# Patient Record
Sex: FEMALE | Race: WHITE | NOT HISPANIC OR LATINO | Employment: OTHER | URBAN - METROPOLITAN AREA
[De-identification: names, ages, dates, MRNs, and addresses within clinical notes are randomized per-mention and may not be internally consistent; named-entity substitution may affect disease eponyms.]

---

## 2023-03-05 LAB — URINE CULTURE: NORMAL

## 2023-03-06 LAB
ANION GAP IN SER/PLAS: 13 MMOL/L (ref 10–20)
CALCIUM (MG/DL) IN SER/PLAS: 10.1 MG/DL (ref 8.6–10.3)
CARBON DIOXIDE, TOTAL (MMOL/L) IN SER/PLAS: 25 MMOL/L (ref 21–32)
CHLORIDE (MMOL/L) IN SER/PLAS: 104 MMOL/L (ref 98–107)
CREATININE (MG/DL) IN SER/PLAS: 1.09 MG/DL (ref 0.5–1.05)
ERYTHROCYTE DISTRIBUTION WIDTH (RATIO) BY AUTOMATED COUNT: 16.3 % (ref 11.5–14.5)
ERYTHROCYTE MEAN CORPUSCULAR HEMOGLOBIN CONCENTRATION (G/DL) BY AUTOMATED: 31.4 G/DL (ref 32–36)
ERYTHROCYTE MEAN CORPUSCULAR VOLUME (FL) BY AUTOMATED COUNT: 92 FL (ref 80–100)
ERYTHROCYTES (10*6/UL) IN BLOOD BY AUTOMATED COUNT: 4.21 X10E12/L (ref 4–5.2)
GFR FEMALE: 53 ML/MIN/1.73M2
GLUCOSE (MG/DL) IN SER/PLAS: 147 MG/DL (ref 74–99)
HEMATOCRIT (%) IN BLOOD BY AUTOMATED COUNT: 38.8 % (ref 36–46)
HEMOGLOBIN (G/DL) IN BLOOD: 12.2 G/DL (ref 12–16)
LEUKOCYTES (10*3/UL) IN BLOOD BY AUTOMATED COUNT: 9 X10E9/L (ref 4.4–11.3)
NRBC (PER 100 WBCS) BY AUTOMATED COUNT: 0 /100 WBC (ref 0–0)
PLATELETS (10*3/UL) IN BLOOD AUTOMATED COUNT: 205 X10E9/L (ref 150–450)
POTASSIUM (MMOL/L) IN SER/PLAS: 4.3 MMOL/L (ref 3.5–5.3)
SODIUM (MMOL/L) IN SER/PLAS: 138 MMOL/L (ref 136–145)
UREA NITROGEN (MG/DL) IN SER/PLAS: 27 MG/DL (ref 6–23)

## 2023-03-08 LAB — URINE CULTURE: NORMAL

## 2023-09-29 ENCOUNTER — APPOINTMENT (OUTPATIENT)
Dept: PRIMARY CARE | Facility: CLINIC | Age: 78
End: 2023-09-29
Payer: MEDICARE

## 2023-10-04 PROBLEM — R06.02 SOB (SHORTNESS OF BREATH): Status: ACTIVE | Noted: 2023-10-04

## 2023-10-04 PROBLEM — N81.4 CYSTOCELE WITH PROLAPSE: Status: ACTIVE | Noted: 2023-10-04

## 2023-10-04 PROBLEM — G54.1 LUMBOSACRAL PLEXOPATHY: Status: ACTIVE | Noted: 2023-10-04

## 2023-10-04 PROBLEM — M41.9 SCOLIOSIS: Status: ACTIVE | Noted: 2023-10-04

## 2023-10-04 PROBLEM — L21.9 SEBORRHEIC DERMATITIS OF SCALP: Status: ACTIVE | Noted: 2023-10-04

## 2023-10-04 PROBLEM — B37.2 MONILIASIS, CUTANEOUS: Status: ACTIVE | Noted: 2023-10-04

## 2023-10-04 PROBLEM — R09.02 HYPOXIA: Status: ACTIVE | Noted: 2023-10-04

## 2023-10-04 PROBLEM — J12.82 PNEUMONIA DUE TO COVID-19 VIRUS: Status: ACTIVE | Noted: 2023-10-04

## 2023-10-04 PROBLEM — M34.9 SCLERODERMA (MULTI): Status: ACTIVE | Noted: 2023-10-04

## 2023-10-04 PROBLEM — K21.9 GERD (GASTROESOPHAGEAL REFLUX DISEASE): Status: ACTIVE | Noted: 2023-10-04

## 2023-10-04 PROBLEM — M34.1 CREST (CALCINOSIS, RAYNAUD'S PHENOMENON, ESOPHAGEAL DYSFUNCTION, SCLERODACTYLY, TELANGIECTASIA) (MULTI): Status: ACTIVE | Noted: 2023-10-04

## 2023-10-04 PROBLEM — I10 BENIGN HYPERTENSION: Status: ACTIVE | Noted: 2023-10-04

## 2023-10-04 PROBLEM — M48.062 LUMBAR STENOSIS WITH NEUROGENIC CLAUDICATION: Status: ACTIVE | Noted: 2023-10-04

## 2023-10-04 PROBLEM — E78.5 HYPERLIPIDEMIA: Status: ACTIVE | Noted: 2023-10-04

## 2023-10-04 PROBLEM — U07.1 PNEUMONIA DUE TO COVID-19 VIRUS: Status: ACTIVE | Noted: 2023-10-04

## 2023-10-04 RX ORDER — FOLIC ACID/MULTIVIT,IRON,MINER 0.4MG-18MG
2 TABLET ORAL DAILY
COMMUNITY

## 2023-10-04 RX ORDER — KETOROLAC TROMETHAMINE 10 MG/1
TABLET, FILM COATED ORAL
COMMUNITY
Start: 2023-03-14 | End: 2024-01-09 | Stop reason: WASHOUT

## 2023-10-04 RX ORDER — CHOLECALCIFEROL (VITAMIN D3) 125 MCG
CAPSULE ORAL
COMMUNITY

## 2023-10-04 RX ORDER — AMLODIPINE BESYLATE 10 MG/1
TABLET ORAL
COMMUNITY
Start: 2023-02-14 | End: 2023-10-05 | Stop reason: ALTCHOICE

## 2023-10-04 RX ORDER — LOSARTAN POTASSIUM 100 MG/1
TABLET ORAL
COMMUNITY
Start: 2023-02-14 | End: 2023-10-05 | Stop reason: ALTCHOICE

## 2023-10-04 RX ORDER — KETOCONAZOLE 20 MG/ML
SHAMPOO, SUSPENSION TOPICAL EVERY OTHER DAY
COMMUNITY
Start: 2019-07-02 | End: 2023-10-05 | Stop reason: SDUPTHER

## 2023-10-04 RX ORDER — PANTOPRAZOLE SODIUM 20 MG/1
1 TABLET, DELAYED RELEASE ORAL DAILY
COMMUNITY
Start: 2016-08-03 | End: 2024-01-15

## 2023-10-04 RX ORDER — CLOBETASOL PROPIONATE 0.5 MG/ML
LOTION TOPICAL 2 TIMES DAILY
COMMUNITY
Start: 2019-07-02

## 2023-10-04 RX ORDER — CHOLECALCIFEROL (VITAMIN D3) 25 MCG
1 TABLET ORAL DAILY
COMMUNITY
End: 2024-01-09 | Stop reason: WASHOUT

## 2023-10-04 RX ORDER — AMLODIPINE BESYLATE 5 MG/1
1 TABLET ORAL DAILY
COMMUNITY
End: 2023-10-06 | Stop reason: SDUPTHER

## 2023-10-04 RX ORDER — CLOTRIMAZOLE AND BETAMETHASONE DIPROPIONATE 10; .64 MG/G; MG/G
CREAM TOPICAL 2 TIMES DAILY
COMMUNITY
Start: 2019-07-02 | End: 2024-01-09 | Stop reason: WASHOUT

## 2023-10-04 RX ORDER — OXYCODONE AND ACETAMINOPHEN 5; 325 MG/1; MG/1
TABLET ORAL
COMMUNITY
Start: 2023-03-31 | End: 2024-01-09 | Stop reason: WASHOUT

## 2023-10-05 ENCOUNTER — TELEPHONE (OUTPATIENT)
Dept: PRIMARY CARE | Facility: CLINIC | Age: 78
End: 2023-10-05

## 2023-10-05 ENCOUNTER — OFFICE VISIT (OUTPATIENT)
Dept: PRIMARY CARE | Facility: CLINIC | Age: 78
End: 2023-10-05
Payer: MEDICARE

## 2023-10-05 VITALS
OXYGEN SATURATION: 95 % | HEIGHT: 63 IN | WEIGHT: 179.8 LBS | BODY MASS INDEX: 31.86 KG/M2 | SYSTOLIC BLOOD PRESSURE: 126 MMHG | DIASTOLIC BLOOD PRESSURE: 82 MMHG | TEMPERATURE: 97.8 F | HEART RATE: 81 BPM

## 2023-10-05 DIAGNOSIS — I10 BENIGN HYPERTENSION: ICD-10-CM

## 2023-10-05 DIAGNOSIS — M17.11 PRIMARY OSTEOARTHRITIS OF RIGHT KNEE: ICD-10-CM

## 2023-10-05 DIAGNOSIS — M41.87 OTHER FORM OF SCOLIOSIS OF LUMBOSACRAL SPINE: ICD-10-CM

## 2023-10-05 DIAGNOSIS — J84.10 PULMONARY FIBROSIS (MULTI): Primary | ICD-10-CM

## 2023-10-05 DIAGNOSIS — R73.9 HYPERGLYCEMIA: ICD-10-CM

## 2023-10-05 DIAGNOSIS — R53.83 OTHER FATIGUE: ICD-10-CM

## 2023-10-05 DIAGNOSIS — E78.5 HYPERLIPIDEMIA, UNSPECIFIED HYPERLIPIDEMIA TYPE: ICD-10-CM

## 2023-10-05 DIAGNOSIS — L21.9 SEBORRHEIC DERMATITIS OF SCALP: Primary | ICD-10-CM

## 2023-10-05 DIAGNOSIS — M34.1 CREST (CALCINOSIS, RAYNAUD'S PHENOMENON, ESOPHAGEAL DYSFUNCTION, SCLERODACTYLY, TELANGIECTASIA) (MULTI): ICD-10-CM

## 2023-10-05 PROBLEM — N81.4 CYSTOCELE WITH PROLAPSE: Status: RESOLVED | Noted: 2023-10-04 | Resolved: 2023-10-05

## 2023-10-05 LAB
ALBUMIN SERPL BCP-MCNC: 4.3 G/DL (ref 3.4–5)
ALP SERPL-CCNC: 100 U/L (ref 33–136)
ALT SERPL W P-5'-P-CCNC: 17 U/L (ref 7–45)
ANION GAP SERPL CALC-SCNC: 15 MMOL/L (ref 10–20)
AST SERPL W P-5'-P-CCNC: 15 U/L (ref 9–39)
BACTERIA #/AREA URNS AUTO: ABNORMAL /HPF
BASOPHILS # BLD AUTO: 0.08 X10*3/UL (ref 0–0.1)
BASOPHILS NFR BLD AUTO: 1 %
BILIRUB SERPL-MCNC: 0.2 MG/DL (ref 0–1.2)
BUN SERPL-MCNC: 28 MG/DL (ref 6–23)
CALCIUM SERPL-MCNC: 10.3 MG/DL (ref 8.6–10.6)
CHLORIDE SERPL-SCNC: 101 MMOL/L (ref 98–107)
CO2 SERPL-SCNC: 26 MMOL/L (ref 21–32)
CREAT SERPL-MCNC: 0.95 MG/DL (ref 0.5–1.05)
EOSINOPHIL # BLD AUTO: 0.23 X10*3/UL (ref 0–0.4)
EOSINOPHIL NFR BLD AUTO: 2.7 %
ERYTHROCYTE [DISTWIDTH] IN BLOOD BY AUTOMATED COUNT: 17.5 % (ref 11.5–14.5)
EST. AVERAGE GLUCOSE BLD GHB EST-MCNC: 128 MG/DL
GFR SERPL CREATININE-BSD FRML MDRD: 62 ML/MIN/1.73M*2
GLUCOSE SERPL-MCNC: 92 MG/DL (ref 74–99)
HBA1C MFR BLD: 6.1 %
HCT VFR BLD AUTO: 40.5 % (ref 36–46)
HGB BLD-MCNC: 12.5 G/DL (ref 12–16)
IMM GRANULOCYTES # BLD AUTO: 0.03 X10*3/UL (ref 0–0.5)
IMM GRANULOCYTES NFR BLD AUTO: 0.4 % (ref 0–0.9)
LYMPHOCYTES # BLD AUTO: 3.35 X10*3/UL (ref 0.8–3)
LYMPHOCYTES NFR BLD AUTO: 40 %
MCH RBC QN AUTO: 28.9 PG (ref 26–34)
MCHC RBC AUTO-ENTMCNC: 30.9 G/DL (ref 32–36)
MCV RBC AUTO: 94 FL (ref 80–100)
MONOCYTES # BLD AUTO: 0.71 X10*3/UL (ref 0.05–0.8)
MONOCYTES NFR BLD AUTO: 8.5 %
MUCOUS THREADS #/AREA URNS AUTO: ABNORMAL /LPF
NEUTROPHILS # BLD AUTO: 3.97 X10*3/UL (ref 1.6–5.5)
NEUTROPHILS NFR BLD AUTO: 47.4 %
NRBC BLD-RTO: 0 /100 WBCS (ref 0–0)
PLATELET # BLD AUTO: 165 X10*3/UL (ref 150–450)
PMV BLD AUTO: 13 FL (ref 7.5–11.5)
POTASSIUM SERPL-SCNC: 4.6 MMOL/L (ref 3.5–5.3)
PROT SERPL-MCNC: 7.7 G/DL (ref 6.4–8.2)
RBC # BLD AUTO: 4.32 X10*6/UL (ref 4–5.2)
RBC #/AREA URNS AUTO: ABNORMAL /HPF
SODIUM SERPL-SCNC: 137 MMOL/L (ref 136–145)
SQUAMOUS #/AREA URNS AUTO: ABNORMAL /HPF
VIT B12 SERPL-MCNC: 1176 PG/ML (ref 211–911)
WBC # BLD AUTO: 8.4 X10*3/UL (ref 4.4–11.3)
WBC #/AREA URNS AUTO: ABNORMAL /HPF

## 2023-10-05 PROCEDURE — 1159F MED LIST DOCD IN RCRD: CPT | Performed by: FAMILY MEDICINE

## 2023-10-05 PROCEDURE — 1036F TOBACCO NON-USER: CPT | Performed by: FAMILY MEDICINE

## 2023-10-05 PROCEDURE — 1160F RVW MEDS BY RX/DR IN RCRD: CPT | Performed by: FAMILY MEDICINE

## 2023-10-05 PROCEDURE — 82607 VITAMIN B-12: CPT

## 2023-10-05 PROCEDURE — 85025 COMPLETE CBC W/AUTO DIFF WBC: CPT

## 2023-10-05 PROCEDURE — 36415 COLL VENOUS BLD VENIPUNCTURE: CPT

## 2023-10-05 PROCEDURE — 99214 OFFICE O/P EST MOD 30 MIN: CPT | Performed by: FAMILY MEDICINE

## 2023-10-05 PROCEDURE — 83036 HEMOGLOBIN GLYCOSYLATED A1C: CPT

## 2023-10-05 PROCEDURE — 80053 COMPREHEN METABOLIC PANEL: CPT

## 2023-10-05 PROCEDURE — 81001 URINALYSIS AUTO W/SCOPE: CPT

## 2023-10-05 PROCEDURE — 3074F SYST BP LT 130 MM HG: CPT | Performed by: FAMILY MEDICINE

## 2023-10-05 PROCEDURE — 1125F AMNT PAIN NOTED PAIN PRSNT: CPT | Performed by: FAMILY MEDICINE

## 2023-10-05 PROCEDURE — 3079F DIAST BP 80-89 MM HG: CPT | Performed by: FAMILY MEDICINE

## 2023-10-05 RX ORDER — KETOCONAZOLE 20 MG/ML
SHAMPOO, SUSPENSION TOPICAL EVERY OTHER DAY
Qty: 120 ML | Refills: 2 | Status: SHIPPED | OUTPATIENT
Start: 2023-10-05

## 2023-10-05 RX ORDER — POLYETHYLENE GLYCOL 3350 17 G/17G
POWDER, FOR SOLUTION ORAL
COMMUNITY
Start: 2023-03-14 | End: 2024-01-09 | Stop reason: WASHOUT

## 2023-10-05 RX ORDER — PHENAZOPYRIDINE HYDROCHLORIDE 100 MG/1
200 TABLET, FILM COATED ORAL 3 TIMES DAILY
COMMUNITY
Start: 2023-03-14 | End: 2024-01-09 | Stop reason: WASHOUT

## 2023-10-05 RX ORDER — ERGOCALCIFEROL 1.25 MG/1
CAPSULE ORAL
COMMUNITY
Start: 2008-09-24 | End: 2024-01-09 | Stop reason: WASHOUT

## 2023-10-05 RX ORDER — GLUCOSAMINE/CHONDROITIN/C/MANG 500-400 MG
CAPSULE ORAL
COMMUNITY
Start: 2008-06-18 | End: 2024-01-09 | Stop reason: WASHOUT

## 2023-10-05 SDOH — ECONOMIC STABILITY: TRANSPORTATION INSECURITY
IN THE PAST 12 MONTHS, HAS THE LACK OF TRANSPORTATION KEPT YOU FROM MEDICAL APPOINTMENTS OR FROM GETTING MEDICATIONS?: NO

## 2023-10-05 SDOH — ECONOMIC STABILITY: TRANSPORTATION INSECURITY
IN THE PAST 12 MONTHS, HAS LACK OF TRANSPORTATION KEPT YOU FROM MEETINGS, WORK, OR FROM GETTING THINGS NEEDED FOR DAILY LIVING?: NO

## 2023-10-05 SDOH — ECONOMIC STABILITY: INCOME INSECURITY: IN THE LAST 12 MONTHS, WAS THERE A TIME WHEN YOU WERE NOT ABLE TO PAY THE MORTGAGE OR RENT ON TIME?: NO

## 2023-10-05 SDOH — ECONOMIC STABILITY: HOUSING INSECURITY
IN THE LAST 12 MONTHS, WAS THERE A TIME WHEN YOU DID NOT HAVE A STEADY PLACE TO SLEEP OR SLEPT IN A SHELTER (INCLUDING NOW)?: NO

## 2023-10-05 SDOH — ECONOMIC STABILITY: FOOD INSECURITY: WITHIN THE PAST 12 MONTHS, THE FOOD YOU BOUGHT JUST DIDN'T LAST AND YOU DIDN'T HAVE MONEY TO GET MORE.: NEVER TRUE

## 2023-10-05 SDOH — ECONOMIC STABILITY: FOOD INSECURITY: WITHIN THE PAST 12 MONTHS, YOU WORRIED THAT YOUR FOOD WOULD RUN OUT BEFORE YOU GOT MONEY TO BUY MORE.: NEVER TRUE

## 2023-10-05 ASSESSMENT — SOCIAL DETERMINANTS OF HEALTH (SDOH)
WITHIN THE LAST YEAR, HAVE YOU BEEN KICKED, HIT, SLAPPED, OR OTHERWISE PHYSICALLY HURT BY YOUR PARTNER OR EX-PARTNER?: NO
WITHIN THE LAST YEAR, HAVE TO BEEN RAPED OR FORCED TO HAVE ANY KIND OF SEXUAL ACTIVITY BY YOUR PARTNER OR EX-PARTNER?: NO
IN THE PAST 12 MONTHS, HAS THE ELECTRIC, GAS, OIL, OR WATER COMPANY THREATENED TO SHUT OFF SERVICE IN YOUR HOME?: NO
HOW HARD IS IT FOR YOU TO PAY FOR THE VERY BASICS LIKE FOOD, HOUSING, MEDICAL CARE, AND HEATING?: NOT HARD AT ALL
WITHIN THE LAST YEAR, HAVE YOU BEEN HUMILIATED OR EMOTIONALLY ABUSED IN OTHER WAYS BY YOUR PARTNER OR EX-PARTNER?: NO
WITHIN THE LAST YEAR, HAVE YOU BEEN AFRAID OF YOUR PARTNER OR EX-PARTNER?: NO

## 2023-10-05 ASSESSMENT — PATIENT HEALTH QUESTIONNAIRE - PHQ9
1. LITTLE INTEREST OR PLEASURE IN DOING THINGS: NOT AT ALL
SUM OF ALL RESPONSES TO PHQ9 QUESTIONS 1 & 2: 0
2. FEELING DOWN, DEPRESSED OR HOPELESS: NOT AT ALL

## 2023-10-05 ASSESSMENT — LIFESTYLE VARIABLES
HOW OFTEN DO YOU HAVE SIX OR MORE DRINKS ON ONE OCCASION: NEVER
AUDIT-C TOTAL SCORE: 0
HOW OFTEN DO YOU HAVE A DRINK CONTAINING ALCOHOL: NEVER
HOW MANY STANDARD DRINKS CONTAINING ALCOHOL DO YOU HAVE ON A TYPICAL DAY: PATIENT DOES NOT DRINK
SKIP TO QUESTIONS 9-10: 1

## 2023-10-05 ASSESSMENT — ENCOUNTER SYMPTOMS
DEPRESSION: 0
CONSTITUTIONAL NEGATIVE: 1
FREQUENCY: 1
SHORTNESS OF BREATH: 1
ARTHRALGIAS: 1
LOSS OF SENSATION IN FEET: 0
CARDIOVASCULAR NEGATIVE: 1
OCCASIONAL FEELINGS OF UNSTEADINESS: 0
BACK PAIN: 1
PSYCHIATRIC NEGATIVE: 1

## 2023-10-05 ASSESSMENT — PAIN SCALES - GENERAL: PAINLEVEL: 8

## 2023-10-05 NOTE — PROGRESS NOTES
"Subjective   Patient ID: Silvia Damico is a 77 y.o. female who presents for consultation (Discuss bp and knee pain).    HPI     Review of Systems   Constitutional: Negative.    Respiratory:  Positive for shortness of breath.    Cardiovascular: Negative.    Genitourinary:  Positive for frequency.   Musculoskeletal:  Positive for arthralgias and back pain.   Psychiatric/Behavioral: Negative.         Objective   /82 (BP Location: Left arm)   Pulse 81   Temp 36.6 °C (97.8 °F) (Temporal)   Ht 1.6 m (5' 3\")   Wt 81.6 kg (179 lb 12.8 oz)   SpO2 95%   BMI 31.85 kg/m²     Physical Exam  Vitals and nursing note reviewed.   Cardiovascular:      Rate and Rhythm: Normal rate and regular rhythm.      Heart sounds: Normal heart sounds.   Pulmonary:      Breath sounds: Normal breath sounds. Decreased air movement present.   Musculoskeletal:      Comments: scoliosis   Neurological:      Mental Status: She is alert and oriented to person, place, and time.   Psychiatric:         Mood and Affect: Mood normal.         Behavior: Behavior normal.         Assessment/Plan patient seen here for some increased shortness of breath especially with exercise.  Her chest x-ray has been slightly abnormal since she had COVID we are getting a CT scan of her chest.  And checking some lab work here today.  She also is getting an x-ray of her right knee.  I will let her know the results as soon as available.  Problem List Items Addressed This Visit             ICD-10-CM    Benign hypertension I10    Relevant Orders    CBC and Auto Differential    Comprehensive Metabolic Panel    Hemoglobin A1C    Urinalysis Microscopic Only    CREST (calcinosis, Raynaud's phenomenon, esophageal dysfunction, sclerodactyly, telangiectasia) (CMS/[facility]) M34.1    Hyperlipidemia E78.5    Scoliosis M41.9     Other Visit Diagnoses         Codes    Pulmonary fibrosis (CMS/[facility])    -  Primary J84.10    Relevant Orders    CT chest wo IV contrast    Primary osteoarthritis of " right knee     M17.11    Relevant Orders    XR knee right 3 views    Other fatigue     R53.83    Relevant Orders    Vitamin B12    Hyperglycemia     R73.9    Relevant Orders    Hemoglobin A1C

## 2023-10-05 NOTE — TELEPHONE ENCOUNTER
Pt was just in & needed refill on amlodipine 5 mg once daily to [facility]  [phone]  Allergy to cephalexin  Ty

## 2023-10-06 DIAGNOSIS — I10 BENIGN HYPERTENSION: Primary | ICD-10-CM

## 2023-10-06 RX ORDER — AMLODIPINE BESYLATE 5 MG/1
5 TABLET ORAL DAILY
Qty: 90 TABLET | Refills: 3 | Status: SHIPPED | OUTPATIENT
Start: 2023-10-06

## 2023-10-09 ENCOUNTER — HOSPITAL ENCOUNTER (OUTPATIENT)
Dept: RADIOLOGY | Facility: HOSPITAL | Age: 78
Discharge: HOME | End: 2023-10-09
Payer: MEDICARE

## 2023-10-09 ENCOUNTER — ANCILLARY PROCEDURE (OUTPATIENT)
Dept: RADIOLOGY | Facility: CLINIC | Age: 78
End: 2023-10-09
Payer: MEDICARE

## 2023-10-09 DIAGNOSIS — M17.11 PRIMARY OSTEOARTHRITIS OF RIGHT KNEE: ICD-10-CM

## 2023-10-09 DIAGNOSIS — J84.10 PULMONARY FIBROSIS (MULTI): ICD-10-CM

## 2023-10-09 PROCEDURE — 71250 CT THORAX DX C-: CPT | Performed by: RADIOLOGY

## 2023-10-09 PROCEDURE — 73562 X-RAY EXAM OF KNEE 3: CPT | Mod: RT,FY

## 2023-10-09 PROCEDURE — 73562 X-RAY EXAM OF KNEE 3: CPT | Mod: RIGHT SIDE | Performed by: RADIOLOGY

## 2023-10-09 PROCEDURE — 71250 CT THORAX DX C-: CPT | Mod: ME

## 2023-10-30 PROBLEM — D69.6 THROMBOCYTOPENIA (CMS-HCC): Status: ACTIVE | Noted: 2022-01-06

## 2023-10-30 RX ORDER — LOSARTAN POTASSIUM 100 MG/1
100 TABLET ORAL DAILY
COMMUNITY
End: 2024-01-09 | Stop reason: WASHOUT

## 2023-10-31 ENCOUNTER — OFFICE VISIT (OUTPATIENT)
Dept: ORTHOPEDIC SURGERY | Facility: CLINIC | Age: 78
End: 2023-10-31
Payer: MEDICARE

## 2023-10-31 ENCOUNTER — ANCILLARY PROCEDURE (OUTPATIENT)
Dept: RADIOLOGY | Facility: CLINIC | Age: 78
End: 2023-10-31
Payer: MEDICARE

## 2023-10-31 VITALS — BODY MASS INDEX: 31.71 KG/M2 | WEIGHT: 179 LBS | HEIGHT: 63 IN

## 2023-10-31 DIAGNOSIS — M17.10 ARTHRITIS OF KNEE: Primary | ICD-10-CM

## 2023-10-31 DIAGNOSIS — M25.561 ACUTE PAIN OF RIGHT KNEE: ICD-10-CM

## 2023-10-31 DIAGNOSIS — M75.81 ROTATOR CUFF TENDINITIS, RIGHT: ICD-10-CM

## 2023-10-31 PROCEDURE — 20610 DRAIN/INJ JOINT/BURSA W/O US: CPT | Performed by: ORTHOPAEDIC SURGERY

## 2023-10-31 PROCEDURE — 3074F SYST BP LT 130 MM HG: CPT | Performed by: ORTHOPAEDIC SURGERY

## 2023-10-31 PROCEDURE — 99204 OFFICE O/P NEW MOD 45 MIN: CPT | Performed by: ORTHOPAEDIC SURGERY

## 2023-10-31 PROCEDURE — 1159F MED LIST DOCD IN RCRD: CPT | Performed by: ORTHOPAEDIC SURGERY

## 2023-10-31 PROCEDURE — 73030 X-RAY EXAM OF SHOULDER: CPT | Mod: RIGHT SIDE | Performed by: STUDENT IN AN ORGANIZED HEALTH CARE EDUCATION/TRAINING PROGRAM

## 2023-10-31 PROCEDURE — 1036F TOBACCO NON-USER: CPT | Performed by: ORTHOPAEDIC SURGERY

## 2023-10-31 PROCEDURE — 1125F AMNT PAIN NOTED PAIN PRSNT: CPT | Performed by: ORTHOPAEDIC SURGERY

## 2023-10-31 PROCEDURE — 73030 X-RAY EXAM OF SHOULDER: CPT | Mod: RT,FY

## 2023-10-31 PROCEDURE — 1160F RVW MEDS BY RX/DR IN RCRD: CPT | Performed by: ORTHOPAEDIC SURGERY

## 2023-10-31 PROCEDURE — 3079F DIAST BP 80-89 MM HG: CPT | Performed by: ORTHOPAEDIC SURGERY

## 2023-10-31 RX ORDER — TRIAMCINOLONE ACETONIDE 40 MG/ML
40 INJECTION, SUSPENSION INTRA-ARTICULAR; INTRAMUSCULAR
Status: COMPLETED | OUTPATIENT
Start: 2023-10-31 | End: 2023-10-31

## 2023-10-31 RX ADMIN — TRIAMCINOLONE ACETONIDE 40 MG: 40 INJECTION, SUSPENSION INTRA-ARTICULAR; INTRAMUSCULAR at 17:26

## 2023-10-31 NOTE — PROGRESS NOTES
77-year-old is seen with right knee pain and right shoulder pain.  She been having persistent severe sharp shooting pain in the right knee is worse with standing and walking.  Pain is worse going up and down stairs and getting up and down from a chair in and out of a car.  She had shingles involving her left leg and still has some left calf numbness.  She is also been having pain in the right shoulder and difficulty with overhead reaching and lifting activities.  She has applied ice and used aspirin.  She uses a cane or a walker when needed.  She has had chiropractic treatments.    Pleasant and no acute distress. Walks with antalgic gait. Stands with varus alignment of the right knee and neutral on the left.  Right knee range of motion is 5-110°. The knee is stable to varus and valgus stress Lachman and posterior drawer. There is a mild effusion. There is generalized tenderness. Left knee range of motion is 0-120°. There is no effusion. The knee is stable to varus and valgus stress Lachman and posterior drawer. Both lower extremities are well perfused the skin is intact and muscle tone is adequate.  The right shoulder forward flexion 160 degrees and there is positive Neer and Krishnamurthy impingement.    Multiple x-ray views of the right knee are personally reviewed and there is advanced degenerative changes involving the right knee with joint space narrowing and osteophyte formation    A discussion about knee arthritis was done.  Treatment options were reviewed and the decision was made proceed with cortisone injection.  She will ice and use aspirin and avoid aggravating activities.  She would benefit from physical therapy for the knee and the shoulder.  She is also been sent for x-rays of the shoulder and these are personally reviewed and these demonstrated no acute bony abnormality.  There is sclerosis along the greater tuberosity..  At some point she may be a candidate for knee replacement and this was reviewed with  her.  In regard to the shoulder if symptoms persist then a injection for the right shoulder could also be considered.    L Inj/Asp: R knee on 10/31/2023 5:26 PM  Indications: pain  Details: 22 G needle, anterolateral approach  Medications: 40 mg triamcinolone acetonide 40 mg/mL  Procedure, treatment alternatives, risks and benefits explained, specific risks discussed. Consent was given by the patient.

## 2023-11-09 NOTE — PROGRESS NOTES
Physical Therapy Evaluation    Patient Name: Silvia Damico  MRN:  [id number]  Today's Date: [unfilled]               INSURANCE  MEDICAREMEDICARE PART A AND B   Visit #1 of 16  Referred by: Teofilo Alejandre M.D.  Referred for   1. Arthritis of knee  Referral to Physical Therapy    Follow Up In Physical Therapy      2. Rotator cuff tendinitis, right  Referral to Physical Therapy    Follow Up In Physical Therapy        Cert Dates Start 11/10/23 Cert Date End 02/05/23    ASSESSMENT    Patient came in 15 min late to the appointment today.  Patient demonstrated decreased range of motion in her right shoulder and right knee, increased pain, decreased activity level and decreased overall function at home and in the community.  Patient has complex medical issues.  Patient will benefit from one on one skilled therapy to address these impairments and return to prior level of functioning.     PLAN  Goals  1. Pt will be independent in HEP in 6-8 weeks  2. Pt will report 0-4/10 R. Knee and right shoulder at rest and with activity in 6-8 weeks.  3. Pt will demonstrate R. shoulder range of motion within functional limits to improve overhead activities, R. Knee range of motion WFL to be able to go for 1-2 blocks walking without pain in 6-8 weeks.  4. Pt will demonstrate 4+ to 5/5 R. Knee  and R. shoulder strength to return to 4-4+/5 in available range for 6-8 weeks to be able to perform daily activities with ease  5. Pt will report 75 % of functional score with LEFS and QUIICK DASH in 6-8 weeks.    Plan of care to include: therapeutic exercise, therapeutic activity, soft tissue mobilization, joint mobilizations, neuromuscular re-education, pt education, self care activities, home program, vaso/cryotherapy, gait training, dry needling, e-stim  2 x/week for 8 weeks.    SUBJECTIVE  Reviewed medical history form with patient and medical screening assessed   77 year old female came in 15 min late, had R. Shoulder problem on and off for a long  time, last fall became worse.  She had a prolapsed bladder repair surgery which made her shoulder worse.  Patient thought it might be due to operating room positioning.  Patient reports she had long Covid last year, was ICU 9 days, was on oxygen for 9 months.  Overall her endurance has decreased, difficulty performing her daily activities,  Pain with walking, reaching over head, daily activities.    Pain:  At worst, pain is 9-10/10 both knee and shoulder  Exacerbating factors include: any activities for Knee and shoulder  At best, pain is: Unable to quantify  Relieving factors include: None    Function:  Sleep: No issues  Lives in: Lives with , in a two level home, negotiates steps with step two gait.  Baseline function: Independent and active, always busy    Work:  Retired    Social: Not much active  Exercise: No    Pt goals:  To return to prior to injury functional level without pain.    Language: English  Potential barriers to treatment: continue to assess    Precautions:  Long covid, Scleroderma, Moderate to severe kyphosis in right thoracic area with moderate to severe scoliosis, Hypoxia, Thromocytopenia, Shortness of breath with minimal exertion such as walking during the evaluation.    Fall risk - None to low      OBJECTIVE *=painful    Ortho:  Outcome Measures: LEFS- 34%, DASH-34%      Gait Ambulates without assistive device with flexed posture, decreased heel strike, toe push off and decreased jerry due to SOB since long Covid per patient    Observation/Posture; Moderate to Severe Scoliosis in thoracic area    Balance:  Decreased balance, difficulty with SLS and tandem stance    Palpation:  Moderate to severe kyphosis in R. thoracic area noted    Sensation: No deficits per light touch    Range of Motion (R, L in degrees)  R. Knee 0-120, Left Knee 0-130  Bilateral hip within functional limits.  Flexibility (R, L)  Not tested due to lack of time  Special Tests  Positive valgus, Milton's sign, audible  crepitus during knee and right shoulder range of motion.  Positive Scarf sign  Positive impingment sign  Positive painful arc  Today's treatment and initial evaluation included:  - Patient education regarding diagnosis, prognosis, contributing factors,  symptom monitoring, importance of HEP, role of PT.  Unable to instruct patient with exercises due to lack of time.

## 2023-11-10 ENCOUNTER — EVALUATION (OUTPATIENT)
Dept: PHYSICAL THERAPY | Facility: CLINIC | Age: 78
End: 2023-11-10
Payer: MEDICARE

## 2023-11-10 DIAGNOSIS — M17.10 ARTHRITIS OF KNEE: ICD-10-CM

## 2023-11-10 DIAGNOSIS — M75.81 ROTATOR CUFF TENDINITIS, RIGHT: ICD-10-CM

## 2023-11-10 PROCEDURE — 97162 PT EVAL MOD COMPLEX 30 MIN: CPT | Mod: GP

## 2023-11-10 ASSESSMENT — ENCOUNTER SYMPTOMS
LOSS OF SENSATION IN FEET: 0
OCCASIONAL FEELINGS OF UNSTEADINESS: 0

## 2023-11-10 NOTE — LETTER
November 11, 2023     Patient: Silvia Damico   YOB: 1945   Date of Visit: 11/10/2023       To Whom it May Concern:    Silvia Damico was seen in my clinic on 11/10/2023. She {Return to school/sport:67702}.    If you have any questions or concerns, please don't hesitate to call.         Sincerely,          Ovidio Morris, PT        CC: No Recipients

## 2023-11-10 NOTE — LETTER
November 11, 2023     Patient: Silvia Damico   YOB: 1945   Date of Visit: 11/10/2023       To Whom It May Concern:    It is my medical opinion that Silvia Damico {Work release (duty restriction):45100}.    If you have any questions or concerns, please don't hesitate to call.         Sincerely,        Ovidio Morris, PT    CC: No Recipients

## 2023-11-17 ENCOUNTER — TREATMENT (OUTPATIENT)
Dept: PHYSICAL THERAPY | Facility: CLINIC | Age: 78
End: 2023-11-17
Payer: MEDICARE

## 2023-11-17 DIAGNOSIS — M75.81 ROTATOR CUFF TENDINITIS, RIGHT: ICD-10-CM

## 2023-11-17 DIAGNOSIS — M17.10 ARTHRITIS OF KNEE: ICD-10-CM

## 2023-11-17 PROCEDURE — 97110 THERAPEUTIC EXERCISES: CPT | Mod: GP

## 2023-11-17 NOTE — PROGRESS NOTES
"  Patient Name:  [unfilled]  Patient MRN: @MRN@  Date: 11/17/2023  Visit number: 2    INSURANCE  Aurthorization: Medical Necessity  MEDICAREMEDICARE PART A AND B, [facility] [place]   Visit #2 of 16  Referred by: Teofilo Alejandre M.D.  Referred for   1. Arthritis of knee  Referral to Physical Therapy     Follow Up In Physical Therapy       2. Rotator cuff tendinitis, right  Referral to Physical Therapy     Follow Up In Physical Therapy   Cert Dates Start 11/10/23 Cert Date End 02/05/23      Assessment: Patient reported of no change in pain with exercises.  Able to tolerate exercises with few modifications.  Completed without increase in pain except pain with standing in low back.    Plan:  Continue with Right shoulder and right knee range of motion, strengthening and manual therapy as needed to achieve long term goal and patient to return    Precautions:  Long covid, Scleroderma, Moderate to severe kyphosis in right thoracic area with moderate to severe scoliosis, Hypoxia, Thromocytopenia, Shortness of breath with minimal exertion such as walking during the evaluation.     Fall risk - None to low         Ortho:  Outcome Measures: LEFS- 34%, DASH-34%    Subjective:  - Patient reports 9-10 in shoulder and knee pain  - Pain (0-10): 9-10 depending on the activity, worst in standing for Low back, weight bearing increases pain in her right knee.   - HEP adherence / understanding: Yes       Objective:  Patient came in with antalgic gait, without any assistive device.    Treatment Performed: (\"NP\" = Not Performed) (HEP=Home exercise program) (**=New Exercises or increased resistance) (NV = Next visit)     Seated marching and leg extension x 10, sidelying hip abduction x 10 on each side  Supine clamshells with red theraband and SL clamshells with red theraband x 10 each.  Standing hip abduction, flexion and extension x 10 each.  Pulleys for shoulder elevation x 10  Seated theraband red rowing and shoulder extension x 10 " each.  Shoulder shrugs, circles and scapular retraction x 10    Therapeutic Exercise:     @LASTTrinity Health System West CampusUE([id number])@ minutes

## 2023-11-28 ENCOUNTER — APPOINTMENT (OUTPATIENT)
Dept: PHYSICAL THERAPY | Facility: CLINIC | Age: 78
End: 2023-11-28
Payer: MEDICARE

## 2023-12-08 ENCOUNTER — TREATMENT (OUTPATIENT)
Dept: PHYSICAL THERAPY | Facility: CLINIC | Age: 78
End: 2023-12-08
Payer: MEDICARE

## 2023-12-08 DIAGNOSIS — M17.10 ARTHRITIS OF KNEE: ICD-10-CM

## 2023-12-08 DIAGNOSIS — M75.81 ROTATOR CUFF TENDINITIS, RIGHT: ICD-10-CM

## 2023-12-08 PROCEDURE — 97110 THERAPEUTIC EXERCISES: CPT | Mod: GP

## 2023-12-08 NOTE — PROGRESS NOTES
"  Patient Name:  Mookie LIZ  Date: 12/08/23  Visit number: 3    INSURANCE  Aurthorization: Medical Necessity  MEDICAREMEDICARE PART A AND B, [facility] [place]   Visit #3 of 16  Referred by: Teofilo Alejandre M.D.  Referred for   1. Arthritis of knee  Referral to Physical Therapy     Follow Up In Physical Therapy       2. Rotator cuff tendinitis, right  Referral to Physical Therapy     Follow Up In Physical Therapy   Cert Dates Start 11/10/23 Cert Date End 02/05/23      Assessment: Patient reported of no change in pain with exercises.  Patient has difficulty with standing exercises.  Unable to progress patient.  Therapist advised patient to see pain management and also to transfer to water therapy.  Patient agreed, she will be contacting therapy department soon.  Plan:  Continue with Right shoulder and right knee range of motion, strengthening and manual therapy as needed to achieve long term goal and patient to return    Precautions:  Long covid, Scleroderma, Moderate to severe kyphosis in right thoracic area with moderate to severe scoliosis, Hypoxia, Thromocytopenia, Shortness of breath with minimal exertion such as walking during the evaluation.     Fall risk - None to low         Ortho:  Outcome Measures: LEFS- 34%, DASH-34%    Subjective:  - Patient reports 9-10 in shoulder and knee pain due to doing too much because of holidays, difficulty with weight bearing exercises.  Patient reports standing hip exercises are painful due to her low back.  - Pain (0-10): 9-10 depending on the activity, worst in standing for Low back, weight bearing increases pain in her right knee.   - HEP adherence / understanding: Yes       Objective:  Patient came in with antalgic gait, without any assistive device.    Treatment Performed: (\"NP\" = Not Performed) (HEP=Home exercise program) (**=New Exercises or increased resistance) (NV = Next visit)   NU Step for 8 min  Seated marching and leg extension x 10,   Side lying hip abduction x 10 " on each side-NT  Supine clamshells with red theraband and SL clamshells with red theraband x 10 each.  Standing hip abduction, flexion and extension x 10 each.-NT  Pulleys for shoulder elevation x 10  Seated theraband red rowing and shoulder extension x 10 each.  Shoulder shrugs, circles and scapular retraction x 10    Therapeutic Exercise:

## 2024-01-09 ENCOUNTER — OFFICE VISIT (OUTPATIENT)
Dept: PRIMARY CARE | Facility: CLINIC | Age: 79
End: 2024-01-09
Payer: MEDICARE

## 2024-01-09 VITALS
BODY MASS INDEX: 32.04 KG/M2 | OXYGEN SATURATION: 91 % | TEMPERATURE: 97.5 F | SYSTOLIC BLOOD PRESSURE: 120 MMHG | HEIGHT: 63 IN | WEIGHT: 180.8 LBS | HEART RATE: 114 BPM | DIASTOLIC BLOOD PRESSURE: 74 MMHG

## 2024-01-09 DIAGNOSIS — J84.10 PULMONARY FIBROSIS (MULTI): ICD-10-CM

## 2024-01-09 DIAGNOSIS — I10 BENIGN HYPERTENSION: ICD-10-CM

## 2024-01-09 DIAGNOSIS — M34.1 CREST (CALCINOSIS, RAYNAUD'S PHENOMENON, ESOPHAGEAL DYSFUNCTION, SCLERODACTYLY, TELANGIECTASIA) (MULTI): ICD-10-CM

## 2024-01-09 DIAGNOSIS — Z00.00 MEDICARE ANNUAL WELLNESS VISIT, SUBSEQUENT: Primary | ICD-10-CM

## 2024-01-09 DIAGNOSIS — M48.062 LUMBAR STENOSIS WITH NEUROGENIC CLAUDICATION: ICD-10-CM

## 2024-01-09 DIAGNOSIS — E78.5 HYPERLIPIDEMIA, UNSPECIFIED HYPERLIPIDEMIA TYPE: ICD-10-CM

## 2024-01-09 DIAGNOSIS — D69.6 THROMBOCYTOPENIA (CMS-HCC): ICD-10-CM

## 2024-01-09 LAB
ALBUMIN SERPL BCP-MCNC: 4 G/DL (ref 3.4–5)
ALP SERPL-CCNC: 92 U/L (ref 33–136)
ALT SERPL W P-5'-P-CCNC: 14 U/L (ref 7–45)
ANION GAP SERPL CALC-SCNC: 13 MMOL/L (ref 10–20)
AST SERPL W P-5'-P-CCNC: 14 U/L (ref 9–39)
BASOPHILS # BLD AUTO: 0.07 X10*3/UL (ref 0–0.1)
BASOPHILS NFR BLD AUTO: 0.8 %
BILIRUB SERPL-MCNC: 0.3 MG/DL (ref 0–1.2)
BUN SERPL-MCNC: 30 MG/DL (ref 6–23)
CALCIUM SERPL-MCNC: 10.2 MG/DL (ref 8.6–10.6)
CHLORIDE SERPL-SCNC: 102 MMOL/L (ref 98–107)
CHOLEST SERPL-MCNC: 251 MG/DL (ref 0–199)
CHOLESTEROL/HDL RATIO: 4.2
CO2 SERPL-SCNC: 30 MMOL/L (ref 21–32)
CREAT SERPL-MCNC: 1.25 MG/DL (ref 0.5–1.05)
EGFRCR SERPLBLD CKD-EPI 2021: 44 ML/MIN/1.73M*2
EOSINOPHIL # BLD AUTO: 0.17 X10*3/UL (ref 0–0.4)
EOSINOPHIL NFR BLD AUTO: 2 %
ERYTHROCYTE [DISTWIDTH] IN BLOOD BY AUTOMATED COUNT: 15.7 % (ref 11.5–14.5)
GLUCOSE SERPL-MCNC: 106 MG/DL (ref 74–99)
HCT VFR BLD AUTO: 39.9 % (ref 36–46)
HCV AB SER QL: NONREACTIVE
HDLC SERPL-MCNC: 59.7 MG/DL
HGB BLD-MCNC: 12.2 G/DL (ref 12–16)
IMM GRANULOCYTES # BLD AUTO: 0.03 X10*3/UL (ref 0–0.5)
IMM GRANULOCYTES NFR BLD AUTO: 0.4 % (ref 0–0.9)
LDLC SERPL CALC-MCNC: 151 MG/DL
LYMPHOCYTES # BLD AUTO: 2.6 X10*3/UL (ref 0.8–3)
LYMPHOCYTES NFR BLD AUTO: 30.4 %
MAGNESIUM SERPL-MCNC: 2.15 MG/DL (ref 1.6–2.4)
MCH RBC QN AUTO: 28.2 PG (ref 26–34)
MCHC RBC AUTO-ENTMCNC: 30.6 G/DL (ref 32–36)
MCV RBC AUTO: 92 FL (ref 80–100)
MONOCYTES # BLD AUTO: 0.62 X10*3/UL (ref 0.05–0.8)
MONOCYTES NFR BLD AUTO: 7.3 %
NEUTROPHILS # BLD AUTO: 5.05 X10*3/UL (ref 1.6–5.5)
NEUTROPHILS NFR BLD AUTO: 59.1 %
NON HDL CHOLESTEROL: 191 MG/DL (ref 0–149)
NRBC BLD-RTO: 0 /100 WBCS (ref 0–0)
PLATELET # BLD AUTO: 134 X10*3/UL (ref 150–450)
POTASSIUM SERPL-SCNC: 4.5 MMOL/L (ref 3.5–5.3)
PROT SERPL-MCNC: 7.3 G/DL (ref 6.4–8.2)
RBC # BLD AUTO: 4.32 X10*6/UL (ref 4–5.2)
SODIUM SERPL-SCNC: 140 MMOL/L (ref 136–145)
TRIGL SERPL-MCNC: 204 MG/DL (ref 0–149)
VLDL: 41 MG/DL (ref 0–40)
WBC # BLD AUTO: 8.5 X10*3/UL (ref 4.4–11.3)

## 2024-01-09 PROCEDURE — 83735 ASSAY OF MAGNESIUM: CPT

## 2024-01-09 PROCEDURE — 85025 COMPLETE CBC W/AUTO DIFF WBC: CPT

## 2024-01-09 PROCEDURE — 99214 OFFICE O/P EST MOD 30 MIN: CPT | Performed by: FAMILY MEDICINE

## 2024-01-09 PROCEDURE — 36415 COLL VENOUS BLD VENIPUNCTURE: CPT

## 2024-01-09 PROCEDURE — 99497 ADVNCD CARE PLAN 30 MIN: CPT | Performed by: FAMILY MEDICINE

## 2024-01-09 PROCEDURE — G0439 PPPS, SUBSEQ VISIT: HCPCS | Performed by: FAMILY MEDICINE

## 2024-01-09 PROCEDURE — 3074F SYST BP LT 130 MM HG: CPT | Performed by: FAMILY MEDICINE

## 2024-01-09 PROCEDURE — 1159F MED LIST DOCD IN RCRD: CPT | Performed by: FAMILY MEDICINE

## 2024-01-09 PROCEDURE — 1170F FXNL STATUS ASSESSED: CPT | Performed by: FAMILY MEDICINE

## 2024-01-09 PROCEDURE — 80053 COMPREHEN METABOLIC PANEL: CPT

## 2024-01-09 PROCEDURE — 1158F ADVNC CARE PLAN TLK DOCD: CPT | Performed by: FAMILY MEDICINE

## 2024-01-09 PROCEDURE — 1125F AMNT PAIN NOTED PAIN PRSNT: CPT | Performed by: FAMILY MEDICINE

## 2024-01-09 PROCEDURE — 80061 LIPID PANEL: CPT

## 2024-01-09 PROCEDURE — 86803 HEPATITIS C AB TEST: CPT

## 2024-01-09 PROCEDURE — 1036F TOBACCO NON-USER: CPT | Performed by: FAMILY MEDICINE

## 2024-01-09 PROCEDURE — 3078F DIAST BP <80 MM HG: CPT | Performed by: FAMILY MEDICINE

## 2024-01-09 PROCEDURE — 1160F RVW MEDS BY RX/DR IN RCRD: CPT | Performed by: FAMILY MEDICINE

## 2024-01-09 ASSESSMENT — ACTIVITIES OF DAILY LIVING (ADL)
TOILETING: INDEPENDENT
NEEDS ASSISTANCE WITH FOOD: INDEPENDENT
EATING: INDEPENDENT
FEEDING YOURSELF: INDEPENDENT
STIL DRIVING: YES
ADEQUATE_TO_COMPLETE_ADL: YES
TAKING MEDICATION: INDEPENDENT
JUDGMENT_ADEQUATE_SAFELY_COMPLETE_DAILY_ACTIVITIES: YES
USING TRANSPORTATION: INDEPENDENT
GROCERY SHOPPING: INDEPENDENT
BATHING: INDEPENDENT
PREPARING MEALS: INDEPENDENT
USING TELEPHONE: INDEPENDENT
DOING HOUSEWORK: INDEPENDENT
MANAGING FINANCES: INDEPENDENT
PATIENT'S MEMORY ADEQUATE TO SAFELY COMPLETE DAILY ACTIVITIES?: YES
DRESSING YOURSELF: INDEPENDENT
WALKS IN HOME: INDEPENDENT
GROOMING: INDEPENDENT

## 2024-01-09 ASSESSMENT — ANXIETY QUESTIONNAIRES
1. FEELING NERVOUS, ANXIOUS, OR ON EDGE: NOT AT ALL
7. FEELING AFRAID AS IF SOMETHING AWFUL MIGHT HAPPEN: NOT AT ALL
4. TROUBLE RELAXING: NOT AT ALL
GAD7 TOTAL SCORE: 0
3. WORRYING TOO MUCH ABOUT DIFFERENT THINGS: NOT AT ALL
2. NOT BEING ABLE TO STOP OR CONTROL WORRYING: NOT AT ALL
5. BEING SO RESTLESS THAT IT IS HARD TO SIT STILL: NOT AT ALL
6. BECOMING EASILY ANNOYED OR IRRITABLE: NOT AT ALL

## 2024-01-09 ASSESSMENT — ENCOUNTER SYMPTOMS
ENDOCRINE NEGATIVE: 1
OCCASIONAL FEELINGS OF UNSTEADINESS: 0
LOSS OF SENSATION IN FEET: 0
GASTROINTESTINAL NEGATIVE: 1
BACK PAIN: 1
CONSTITUTIONAL NEGATIVE: 1
SHORTNESS OF BREATH: 1
DEPRESSION: 0
NEUROLOGICAL NEGATIVE: 1
ARTHRALGIAS: 1
CARDIOVASCULAR NEGATIVE: 1
PSYCHIATRIC NEGATIVE: 1

## 2024-01-09 ASSESSMENT — GERIATRIC MINI NUTRITIONAL ASSESSMENT (MNA)
B WEIGHT LOSS DURING THE LAST 3 MONTHS: NO WEIGHT LOSS
C GENERAL MOBILITY: GOES OUT
A HAS FOOD INTAKE DECLINED OVER THE PAST 3 MONTHS DUE TO LOSS OF APPETITE, DIGESTIVE PROBLEMS, CHEWING OR SWALLOWING DIFFICULTIES?: NO DECREASE IN FOOD INTAKE
E NEUROPSYCHOLOGICAL PROBLEMS: NO PSYCHOLOGICAL PROBLEMS
D HAS SUFFERED PSYCHOLOGICAL STRESS OR ACUTE DISEASE IN THE PAST 3 MONTHS?: NO

## 2024-01-09 ASSESSMENT — PAIN SCALES - GENERAL: PAINLEVEL: 6

## 2024-01-09 NOTE — ACP (ADVANCE CARE PLANNING)
Confirming Previous Code Status:   Advance Care Planning Note     Discussion Date: 01/09/24   Discussion Participants: patient    The patient wishes to discuss Advance Care Planning today and the following is a brief summary of our discussion.     Patient has capacity to make their own medical decisions: Yes  Health Care Agent/Surrogate Decision Maker documented in chart: Yes    Documents on file and valid:  Advance Directive/Living Will: Yes   Health Care Power of : Yes  Other: none    Communication of Medical Status/Prognosis:   yes     Communication of Treatment Goals/Options:   yes     Treatment Decisions  Goals of Care: survival is paramount regardless of prognosis, treatment outcome, or burden   yes  Follow Up Plan  no  Team Members  myself  Time Statement: Total face to face time spent on advance care planning was 16 minutes with 16 minutes spent in counseling, including the explanation.    Darshan White,   1/9/2024 2:53 PM

## 2024-01-09 NOTE — PROGRESS NOTES
"Subjective   Patient ID: Silvia Damico is a 78 y.o. female who presents for Annual Exam (Assessment annual medicare wellness).    HPI     Review of Systems   Constitutional: Negative.    HENT: Negative.     Respiratory:  Positive for shortness of breath.    Cardiovascular: Negative.    Gastrointestinal: Negative.    Endocrine: Negative.    Genitourinary: Negative.    Musculoskeletal:  Positive for arthralgias and back pain.   Neurological: Negative.    Psychiatric/Behavioral: Negative.         Objective   /74 (BP Location: Left arm)   Pulse (!) 114   Temp 36.4 °C (97.5 °F) (Temporal)   Ht 1.6 m (5' 3\")   Wt 82 kg (180 lb 12.8 oz)   SpO2 91%   BMI 32.03 kg/m²     Physical Exam  Vitals and nursing note reviewed.   Constitutional:       Appearance: Normal appearance.   HENT:      Right Ear: Tympanic membrane normal.      Left Ear: Tympanic membrane normal.      Mouth/Throat:      Pharynx: Oropharynx is clear.   Cardiovascular:      Rate and Rhythm: Normal rate and regular rhythm.      Pulses: Normal pulses.      Heart sounds: Normal heart sounds.   Pulmonary:      Breath sounds: Normal breath sounds.   Abdominal:      Palpations: Abdomen is soft.   Musculoskeletal:      Comments: Scoliosis DJD of the right knee and right shoulder   Neurological:      General: No focal deficit present.      Mental Status: She is alert and oriented to person, place, and time.   Psychiatric:         Mood and Affect: Mood normal.         Behavior: Behavior normal.         Assessment/Plan patient seen here for an annual Medicare wellness exam.  Reviewed her questionnaire she is agreeable to her responses.  We did discuss advanced directives.  She has no significant difficulty with depression or anxiety.  We are drawing her lab work here today.  We did refer her to pain management for her scoliosis and severe lower back pain.  I will see her back in a year  Problem List Items Addressed This Visit             ICD-10-CM    Benign " hypertension I10    Relevant Orders    CBC and Auto Differential    Comprehensive Metabolic Panel    CREST (calcinosis, Raynaud's phenomenon, esophageal dysfunction, sclerodactyly, telangiectasia) (CMS/[facility]) M34.1    Hyperlipidemia E78.5    Relevant Orders    Lipid Panel    Lumbar stenosis with neurogenic claudication M48.062    Relevant Orders    Referral to Pain Medicine    Thrombocytopenia (CMS/[facility]) D69.6    Pulmonary fibrosis (CMS/[facility]) J84.10     Other Visit Diagnoses         Codes    Medicare annual wellness visit, subsequent    -  Primary Z00.00    Relevant Orders    Hepatitis C antibody    BMI 32.0-32.9,adult     Z68.32

## 2024-01-15 DIAGNOSIS — K21.9 GASTROESOPHAGEAL REFLUX DISEASE WITHOUT ESOPHAGITIS: Primary | ICD-10-CM

## 2024-01-15 RX ORDER — PANTOPRAZOLE SODIUM 20 MG/1
20 TABLET, DELAYED RELEASE ORAL DAILY
Qty: 90 TABLET | Refills: 3 | Status: SHIPPED | OUTPATIENT
Start: 2024-01-15

## 2024-01-25 ENCOUNTER — NURSE TRIAGE (OUTPATIENT)
Dept: PRIMARY CARE | Facility: CLINIC | Age: 79
End: 2024-01-25
Payer: MEDICARE

## 2024-06-26 ENCOUNTER — HOSPITAL ENCOUNTER (OUTPATIENT)
Dept: RADIOLOGY | Facility: CLINIC | Age: 79
Discharge: HOME | End: 2024-06-26
Payer: MEDICARE

## 2024-06-26 DIAGNOSIS — M85.80 OSTEOPENIA: ICD-10-CM

## 2024-06-26 PROCEDURE — 77080 DXA BONE DENSITY AXIAL: CPT | Mod: GA

## 2024-07-02 ENCOUNTER — TELEPHONE (OUTPATIENT)
Dept: PRIMARY CARE | Facility: CLINIC | Age: 79
End: 2024-07-02
Payer: MEDICARE

## 2024-08-01 ENCOUNTER — APPOINTMENT (OUTPATIENT)
Dept: CARDIOLOGY | Facility: CLINIC | Age: 79
End: 2024-08-01
Payer: MEDICARE

## 2024-08-23 ENCOUNTER — HOSPITAL ENCOUNTER (OUTPATIENT)
Dept: RESPIRATORY THERAPY | Facility: HOSPITAL | Age: 79
Discharge: HOME | End: 2024-08-23
Payer: MEDICARE

## 2024-08-23 DIAGNOSIS — R06.00 DYSPNEA, UNSPECIFIED TYPE: ICD-10-CM

## 2024-08-23 LAB
MGC ASCENT PFT - FEV1 - POST: 1.9
MGC ASCENT PFT - FEV1 - PRE: 1.89
MGC ASCENT PFT - FEV1 - PREDICTED: 1.91
MGC ASCENT PFT - FVC - POST: 2.56
MGC ASCENT PFT - FVC - PRE: 2.56
MGC ASCENT PFT - FVC - PREDICTED: 2.49

## 2024-08-23 PROCEDURE — 94618 PULMONARY STRESS TESTING: CPT

## 2024-08-23 PROCEDURE — 94726 PLETHYSMOGRAPHY LUNG VOLUMES: CPT

## 2024-09-13 ENCOUNTER — OFFICE VISIT (OUTPATIENT)
Dept: PRIMARY CARE | Facility: CLINIC | Age: 79
End: 2024-09-13
Payer: MEDICARE

## 2024-09-13 VITALS
WEIGHT: 179.4 LBS | HEART RATE: 84 BPM | TEMPERATURE: 97.4 F | BODY MASS INDEX: 31.79 KG/M2 | SYSTOLIC BLOOD PRESSURE: 140 MMHG | OXYGEN SATURATION: 97 % | HEIGHT: 63 IN | DIASTOLIC BLOOD PRESSURE: 80 MMHG

## 2024-09-13 DIAGNOSIS — L50.0 URTICARIA DUE TO DRUG ALLERGY: Primary | ICD-10-CM

## 2024-09-13 DIAGNOSIS — E78.5 HYPERLIPIDEMIA, UNSPECIFIED HYPERLIPIDEMIA TYPE: ICD-10-CM

## 2024-09-13 DIAGNOSIS — I10 BENIGN HYPERTENSION: ICD-10-CM

## 2024-09-13 DIAGNOSIS — T50.905A URTICARIA DUE TO DRUG ALLERGY: Primary | ICD-10-CM

## 2024-09-13 PROCEDURE — 1126F AMNT PAIN NOTED NONE PRSNT: CPT | Performed by: FAMILY MEDICINE

## 2024-09-13 PROCEDURE — 1160F RVW MEDS BY RX/DR IN RCRD: CPT | Performed by: FAMILY MEDICINE

## 2024-09-13 PROCEDURE — 99213 OFFICE O/P EST LOW 20 MIN: CPT | Performed by: FAMILY MEDICINE

## 2024-09-13 PROCEDURE — 1036F TOBACCO NON-USER: CPT | Performed by: FAMILY MEDICINE

## 2024-09-13 PROCEDURE — 3079F DIAST BP 80-89 MM HG: CPT | Performed by: FAMILY MEDICINE

## 2024-09-13 PROCEDURE — 1159F MED LIST DOCD IN RCRD: CPT | Performed by: FAMILY MEDICINE

## 2024-09-13 PROCEDURE — 3077F SYST BP >= 140 MM HG: CPT | Performed by: FAMILY MEDICINE

## 2024-09-13 RX ORDER — PREDNISONE 10 MG/1
TABLET ORAL
Qty: 30 TABLET | Refills: 0 | Status: SHIPPED | OUTPATIENT
Start: 2024-09-13

## 2024-09-13 ASSESSMENT — ENCOUNTER SYMPTOMS
OCCASIONAL FEELINGS OF UNSTEADINESS: 0
LOSS OF SENSATION IN FEET: 0
CONSTITUTIONAL NEGATIVE: 1
DEPRESSION: 0

## 2024-09-13 ASSESSMENT — PAIN SCALES - GENERAL: PAINLEVEL: 0-NO PAIN

## 2024-09-13 NOTE — PROGRESS NOTES
"Subjective   Patient ID: Silvia Damico is a 78 y.o. female who presents for c/o (Rash whole body x 2wks).    HPI     Review of Systems   Constitutional: Negative.    Skin:  Positive for rash.       Objective   /80 (BP Location: Left arm)   Pulse 84   Temp 36.3 °C (97.4 °F) (Temporal)   Ht 1.6 m (5' 3\")   Wt 81.4 kg (179 lb 6.4 oz)   SpO2 97%   BMI 31.78 kg/m²     Physical Exam  Vitals and nursing note reviewed.   Constitutional:       Appearance: Normal appearance.   Cardiovascular:      Rate and Rhythm: Normal rate and regular rhythm.      Heart sounds: Normal heart sounds.   Pulmonary:      Breath sounds: Normal breath sounds.   Skin:     Comments: Diffuse urticaria   Neurological:      General: No focal deficit present.      Mental Status: She is alert and oriented to person, place, and time.   Psychiatric:         Mood and Affect: Mood normal.         Behavior: Behavior normal.         Assessment/Plan patient seen here with some urticaria that may be secondary to a new vitamin that she started we are starting her on oral steroids she will let me know if does not resolve  Problem List Items Addressed This Visit             ICD-10-CM    Benign hypertension I10    Hyperlipidemia E78.5     Other Visit Diagnoses         Codes    Urticaria due to drug allergy    -  Primary L50.0, T50.905A    Relevant Medications    predniSONE (Deltasone) 10 mg tablet               "

## 2024-09-24 ENCOUNTER — HOSPITAL ENCOUNTER (OUTPATIENT)
Dept: CARDIOLOGY | Facility: CLINIC | Age: 79
Discharge: HOME | End: 2024-09-24
Payer: MEDICARE

## 2024-09-24 DIAGNOSIS — R06.02 SHORTNESS OF BREATH: ICD-10-CM

## 2024-09-24 PROCEDURE — 93306 TTE W/DOPPLER COMPLETE: CPT | Performed by: INTERNAL MEDICINE

## 2024-09-24 PROCEDURE — 93306 TTE W/DOPPLER COMPLETE: CPT

## 2024-09-25 LAB
AORTIC VALVE MEAN GRADIENT: 9.4 MMHG
AORTIC VALVE PEAK VELOCITY: 2.17 M/S
AV PEAK GRADIENT: 18.8 MMHG
AVA (PEAK VEL): 1.74 CM2
AVA (VTI): 2.25 CM2
EJECTION FRACTION APICAL 4 CHAMBER: 57.5
EJECTION FRACTION: 63 %
LEFT ATRIUM VOLUME AREA LENGTH INDEX BSA: 26.8 ML/M2
LEFT VENTRICLE INTERNAL DIMENSION DIASTOLE: 4.11 CM (ref 3.5–6)
LEFT VENTRICULAR OUTFLOW TRACT DIAMETER: 2 CM
RIGHT VENTRICLE FREE WALL PEAK S': 0.12 CM/S
RIGHT VENTRICLE PEAK SYSTOLIC PRESSURE: 24.5 MMHG
TRICUSPID ANNULAR PLANE SYSTOLIC EXCURSION: 1.7 CM

## 2024-09-26 ENCOUNTER — TELEPHONE (OUTPATIENT)
Dept: PRIMARY CARE | Facility: CLINIC | Age: 79
End: 2024-09-26
Payer: MEDICARE

## 2024-09-27 ENCOUNTER — CLINICAL SUPPORT (OUTPATIENT)
Dept: PRIMARY CARE | Facility: CLINIC | Age: 79
End: 2024-09-27
Payer: MEDICARE

## 2024-09-27 DIAGNOSIS — T50.905A URTICARIA DUE TO DRUG ALLERGY: Primary | ICD-10-CM

## 2024-09-27 DIAGNOSIS — L50.0 URTICARIA DUE TO DRUG ALLERGY: Primary | ICD-10-CM

## 2024-09-27 DIAGNOSIS — J30.9 ALLERGIC RHINITIS, UNSPECIFIED SEASONALITY, UNSPECIFIED TRIGGER: ICD-10-CM

## 2024-09-27 RX ORDER — METHYLPREDNISOLONE ACETATE 80 MG/ML
80 INJECTION, SUSPENSION INTRA-ARTICULAR; INTRALESIONAL; INTRAMUSCULAR; SOFT TISSUE ONCE
Status: COMPLETED | OUTPATIENT
Start: 2024-09-27 | End: 2024-09-27

## 2024-11-25 ENCOUNTER — TELEPHONE (OUTPATIENT)
Dept: PRIMARY CARE | Facility: CLINIC | Age: 79
End: 2024-11-25
Payer: MEDICARE

## 2024-11-25 DIAGNOSIS — I10 BENIGN HYPERTENSION: ICD-10-CM

## 2024-11-25 NOTE — TELEPHONE ENCOUNTER
Pt is dr. GOODMAN pt & here in june  Would like amlodipine bes. 5 mg-once daily to [facility] [phone]  Allergy to cephalexin  Ty

## 2024-11-26 RX ORDER — AMLODIPINE BESYLATE 5 MG/1
5 TABLET ORAL DAILY
Qty: 90 TABLET | Refills: 0 | Status: SHIPPED | OUTPATIENT
Start: 2024-11-26

## 2024-12-10 PROBLEM — M47.816 SPONDYLOSIS OF LUMBAR SPINE: Status: ACTIVE | Noted: 2024-06-18

## 2024-12-10 PROBLEM — J44.1 ACUTE EXACERBATION OF CHRONIC OBSTRUCTIVE PULMONARY DISEASE (MULTI): Status: ACTIVE | Noted: 2023-03-14

## 2024-12-10 PROBLEM — L73.2 HIDRADENITIS SUPPURATIVA: Status: ACTIVE | Noted: 2024-12-10

## 2024-12-10 PROBLEM — M85.80 OSTEOPENIA: Status: ACTIVE | Noted: 2024-06-18

## 2024-12-16 DIAGNOSIS — M54.16 LUMBAR RADICULOPATHY: Primary | ICD-10-CM

## 2024-12-19 ENCOUNTER — OFFICE VISIT (OUTPATIENT)
Dept: CARDIOLOGY | Facility: CLINIC | Age: 79
End: 2024-12-19
Payer: MEDICARE

## 2024-12-19 ENCOUNTER — HOSPITAL ENCOUNTER (OUTPATIENT)
Dept: PAIN MEDICINE | Facility: CLINIC | Age: 79
Discharge: HOME | End: 2024-12-19
Payer: MEDICARE

## 2024-12-19 VITALS
DIASTOLIC BLOOD PRESSURE: 86 MMHG | SYSTOLIC BLOOD PRESSURE: 142 MMHG | OXYGEN SATURATION: 100 % | HEIGHT: 63 IN | BODY MASS INDEX: 31.93 KG/M2 | HEART RATE: 83 BPM | WEIGHT: 180.2 LBS

## 2024-12-19 VITALS
RESPIRATION RATE: 16 BRPM | OXYGEN SATURATION: 98 % | HEART RATE: 97 BPM | WEIGHT: 179 LBS | SYSTOLIC BLOOD PRESSURE: 156 MMHG | TEMPERATURE: 97.3 F | BODY MASS INDEX: 31.71 KG/M2 | DIASTOLIC BLOOD PRESSURE: 81 MMHG

## 2024-12-19 DIAGNOSIS — J12.82 PNEUMONIA DUE TO COVID-19 VIRUS: ICD-10-CM

## 2024-12-19 DIAGNOSIS — I27.0 PRIMARY PULMONARY HYPERTENSION (MULTI): ICD-10-CM

## 2024-12-19 DIAGNOSIS — R06.02 SOB (SHORTNESS OF BREATH): ICD-10-CM

## 2024-12-19 DIAGNOSIS — I10 BENIGN HYPERTENSION: ICD-10-CM

## 2024-12-19 DIAGNOSIS — J44.1 ACUTE EXACERBATION OF CHRONIC OBSTRUCTIVE PULMONARY DISEASE (MULTI): ICD-10-CM

## 2024-12-19 DIAGNOSIS — M34.1 CREST (CALCINOSIS, RAYNAUD'S PHENOMENON, ESOPHAGEAL DYSFUNCTION, SCLERODACTYLY, TELANGIECTASIA) (MULTI): ICD-10-CM

## 2024-12-19 DIAGNOSIS — I65.29 CAROTID ATHEROSCLEROSIS, UNSPECIFIED LATERALITY: Primary | ICD-10-CM

## 2024-12-19 DIAGNOSIS — E78.5 HYPERLIPIDEMIA, UNSPECIFIED HYPERLIPIDEMIA TYPE: ICD-10-CM

## 2024-12-19 DIAGNOSIS — D69.6 THROMBOCYTOPENIA (CMS-HCC): ICD-10-CM

## 2024-12-19 DIAGNOSIS — R42 LIGHTHEADED: ICD-10-CM

## 2024-12-19 DIAGNOSIS — U07.1 PNEUMONIA DUE TO COVID-19 VIRUS: ICD-10-CM

## 2024-12-19 DIAGNOSIS — M54.16 LUMBAR RADICULOPATHY: ICD-10-CM

## 2024-12-19 PROCEDURE — 1159F MED LIST DOCD IN RCRD: CPT | Performed by: INTERNAL MEDICINE

## 2024-12-19 PROCEDURE — 2500000004 HC RX 250 GENERAL PHARMACY W/ HCPCS (ALT 636 FOR OP/ED): Performed by: INTERNAL MEDICINE

## 2024-12-19 PROCEDURE — 99214 OFFICE O/P EST MOD 30 MIN: CPT | Performed by: INTERNAL MEDICINE

## 2024-12-19 PROCEDURE — 3079F DIAST BP 80-89 MM HG: CPT | Performed by: INTERNAL MEDICINE

## 2024-12-19 PROCEDURE — 64483 NJX AA&/STRD TFRM EPI L/S 1: CPT | Mod: 50 | Performed by: INTERNAL MEDICINE

## 2024-12-19 PROCEDURE — 64484 NJX AA&/STRD TFRM EPI L/S EA: CPT | Mod: 50 | Performed by: INTERNAL MEDICINE

## 2024-12-19 PROCEDURE — 3077F SYST BP >= 140 MM HG: CPT | Performed by: INTERNAL MEDICINE

## 2024-12-19 RX ORDER — METOPROLOL TARTRATE 100 MG/1
100 TABLET ORAL SEE ADMIN INSTRUCTIONS
COMMUNITY

## 2024-12-19 RX ORDER — LIDOCAINE HYDROCHLORIDE 10 MG/ML
INJECTION, SOLUTION EPIDURAL; INFILTRATION; INTRACAUDAL; PERINEURAL AS NEEDED
Status: COMPLETED | OUTPATIENT
Start: 2024-12-19 | End: 2024-12-19

## 2024-12-19 RX ORDER — METHYLPREDNISOLONE ACETATE 80 MG/ML
INJECTION, SUSPENSION INTRA-ARTICULAR; INTRALESIONAL; INTRAMUSCULAR; SOFT TISSUE AS NEEDED
Status: COMPLETED | OUTPATIENT
Start: 2024-12-19 | End: 2024-12-19

## 2024-12-19 SDOH — ECONOMIC STABILITY: FOOD INSECURITY: WITHIN THE PAST 12 MONTHS, YOU WORRIED THAT YOUR FOOD WOULD RUN OUT BEFORE YOU GOT MONEY TO BUY MORE.: NEVER TRUE

## 2024-12-19 SDOH — ECONOMIC STABILITY: FOOD INSECURITY: WITHIN THE PAST 12 MONTHS, THE FOOD YOU BOUGHT JUST DIDN'T LAST AND YOU DIDN'T HAVE MONEY TO GET MORE.: NEVER TRUE

## 2024-12-19 ASSESSMENT — PAIN SCALES - GENERAL
PAINLEVEL_OUTOF10: 7
PAINLEVEL_OUTOF10: 7

## 2024-12-19 ASSESSMENT — ENCOUNTER SYMPTOMS
LOSS OF SENSATION IN FEET: 1
DEPRESSION: 0
OCCASIONAL FEELINGS OF UNSTEADINESS: 1

## 2024-12-19 ASSESSMENT — LIFESTYLE VARIABLES
AUDIT-C TOTAL SCORE: 0
SKIP TO QUESTIONS 9-10: 1
HOW OFTEN DO YOU HAVE SIX OR MORE DRINKS ON ONE OCCASION: NEVER
HOW OFTEN DO YOU HAVE A DRINK CONTAINING ALCOHOL: NEVER
HOW MANY STANDARD DRINKS CONTAINING ALCOHOL DO YOU HAVE ON A TYPICAL DAY: PATIENT DOES NOT DRINK

## 2024-12-19 ASSESSMENT — PATIENT HEALTH QUESTIONNAIRE - PHQ9
2. FEELING DOWN, DEPRESSED OR HOPELESS: NOT AT ALL
SUM OF ALL RESPONSES TO PHQ9 QUESTIONS 1 & 2: 0
1. LITTLE INTEREST OR PLEASURE IN DOING THINGS: NOT AT ALL

## 2024-12-19 ASSESSMENT — COLUMBIA-SUICIDE SEVERITY RATING SCALE - C-SSRS
1. IN THE PAST MONTH, HAVE YOU WISHED YOU WERE DEAD OR WISHED YOU COULD GO TO SLEEP AND NOT WAKE UP?: NO
6. HAVE YOU EVER DONE ANYTHING, STARTED TO DO ANYTHING, OR PREPARED TO DO ANYTHING TO END YOUR LIFE?: NO
2. HAVE YOU ACTUALLY HAD ANY THOUGHTS OF KILLING YOURSELF?: NO

## 2024-12-19 ASSESSMENT — PAIN - FUNCTIONAL ASSESSMENT: PAIN_FUNCTIONAL_ASSESSMENT: 0-10

## 2024-12-19 NOTE — LETTER
December 26, 2024     Darshan White DO  [address]  [address]  [facility] [address]    Patient: Silvia Damico   YOB: 1945   Date of Visit: 12/19/2024       Dear Dr. Darshan White DO:    Thank you for referring Silvia Damico to me for evaluation. Below are my notes for this consultation.  If you have questions, please do not hesitate to call me. I look forward to following your patient along with you.       Sincerely,     Milind Alicia MD      CC: No Recipients  ______________________________________________________________________________________    PCP: Darshan White DO    Subjective  Silvia Daimco is a 79 y.o. female who complains of  MILNER. Patient is wife of one of my patients (Amrit Damico).    Seen by orthopedics for back scoliosis - thoracic cavity is rolling fwd.  PFT done August - normal. Patient wonders if from thoracic cavity changes.    Additional complaints include:  Gnawing/muscle pain in mid chest when she wakes up in morning.   Out of nowhere she gets lightheaded; not associated with positional change.    Review of Systems:  Otherwise, limited cardiovascular review of systems is negative.    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), Encounter for screening for malignant neoplasm of colon (01/16/2019), Encounter for screening for malignant neoplasm of colon (03/13/2019), Other abnormal and inconclusive findings on diagnostic imaging of breast (06/16/2017), Overweight (01/27/2022), Personal history of diseases of the skin and subcutaneous tissue (06/13/2016), Personal history of other diseases of the circulatory system (10/21/2021), Personal history of other diseases of the musculoskeletal system and connective tissue, Personal history of other infectious and parasitic diseases, Personal history of other infectious and parasitic diseases  "(09/09/2022), and Personal history of other specified conditions.    Surgical History:   She has a past surgical history that includes Appendectomy (05/26/2016) and Hysterectomy (05/26/2016).    Family History:   Family History   Problem Relation Name Age of Onset   • Coronary artery disease Father     • Other (gastic carcinoma) Other     • Diabetes Sibling         Social History:   Tobacco Use: Medium Risk (12/19/2024)    Patient History    • Smoking Tobacco Use: Former    • Smokeless Tobacco Use: Never    • Passive Exposure: Not on file       Outpatient Medications:    Current Outpatient Medications:   •  amLODIPine (Norvasc) 5 mg tablet, Take 1 tablet (5 mg) by mouth once daily., Disp: 90 tablet, Rfl: 0  •  cholecalciferol (Vitamin D-3) 125 MCG (5000 UT) capsule, Take by mouth., Disp: , Rfl:   •  clobetasoL 0.05 % lotion, twice a day., Disp: , Rfl:   •  ketoconazole (NIZOral) 2 % shampoo, Apply topically every other day. Apply to scalp every other day for 5 minutes and rinse, Disp: 120 mL, Rfl: 2  •  krill-omega-3-dha-epa-lipids 136-76-68-50 mg capsule, Take 2 tablets by mouth once daily., Disp: , Rfl:   •  Lactobacillus acidophilus (PROBIOTIC ORAL), Take 1 tablet by mouth once daily., Disp: , Rfl:   •  pantoprazole (ProtoNix) 20 mg EC tablet, TAKE 1 TABLET DAILY, Disp: 90 tablet, Rfl: 3  •  predniSONE (Deltasone) 10 mg tablet, 1 tablet 3 times daily for 5 days then 1 tablet twice daily for 5 days then 1 tablet daily for 5 days, Disp: 30 tablet, Rfl: 0  No current facility-administered medications for this visit.     Allergies:  Cephalexin       Objective  Vital Signs:  /86 (BP Location: Left arm, Patient Position: Sitting, BP Cuff Size: Adult)   Pulse 83   Ht 1.6 m (5' 3\")   Wt 81.7 kg (180 lb 3.2 oz)   SpO2 100%   BMI 31.92 kg/m²     Physical Exam:  General: no acute distress  HEENT: EOMI, no scleral icterus.  Lungs: Clear to auscultation bilaterally without wheezing, rales, or " rhonchi.  Cardiovascular: Regular rhythm and rate. Normal S1 and S2. No murmurs, rubs, or gallops are appreciated. JVP normal.  no bruits  Abdomen: Soft, nontender, nondistended. Bowel sounds present.  Extremities: warm with 1+ distal pulses.  Neurologic: Alert and oriented x3.    Pertinent Recent Cardiovascular Studies (personally reviewed):  Cardiac studies:  Echocardiogram 9/24/24:  normal EF with impaired relaxation. AV mild sclerosis with trivial AI. RV is normal with normal RVSP.  IVC is collapsible.     Laboratory values:  CMP:  Recent Labs     01/09/24  1459 10/05/23  1623 03/06/23  1334 02/14/23  1414 11/01/22  1504 01/27/22  1303 01/14/22  0518 01/13/22  0548 01/11/22  0523 01/09/22  0507 01/08/22  0532 10/21/21  0000 09/24/19  1634    137 138 137 139 138 135* 136   < > 136 138   < > 139   K 4.5 4.6 4.3 4.0 4.3 4.3 4.3 4.2   < > 4.4 4.7   < > 4.2    101 104 103 103 99 100 100   < > 105 106   < > 103   CO2 30 26 25 27 27 29 28 26   < > 23 22   < > 30   ANIONGAP 13 15 13 11 13 14 11 14   < > 12 15   < > 10   BUN 30* 28* 27* 33* 24* 30* 19 16   < > 15 18   < > 24*   CREATININE 1.25* 0.95 1.09* 1.03 1.03 0.81 0.79 0.54   < > 0.65 0.66   < > 0.85   EGFR 44* 62  --   --   --   --   --   --   --   --   --   --   --    MG 2.15  --   --   --   --   --   --   --   --  1.83 1.59*  --  2.31    < > = values in this interval not displayed.     Recent Labs     01/09/24  1459 10/05/23  1623 11/01/22  1504 01/27/22  1303 01/14/22  0518 09/24/19  1634 10/31/18  0035   ALBUMIN 4.0 4.3 3.8 3.3* 3.0*   < > 4.2   ALKPHOS 92 100 100 77 62   < > 102   ALT 14 17 15 17 33   < > 17   AST 14 15 16 12 21   < > 16   BILITOT 0.3 0.2 0.3 0.3 0.4   < > 0.3   LIPASE  --   --   --   --   --   --  25    < > = values in this interval not displayed.     CBC:  Recent Labs     01/09/24  1459 10/05/23  1623 03/06/23  1334 02/14/23  1414 11/01/22  1504 01/27/22  1303 01/14/22  0518 01/13/22  0548   WBC 8.5 8.4 9.0 8.7 9.0 10.5 5.8 4.1*    HGB 12.2 12.5 12.2 11.9* 12.4 12.7 12.4 12.2   HCT 39.9 40.5 38.8 37.5 39.4 40.4 36.8 36.0   * 165 205 270 146* 122* 233 200   MCV 92 94 92 90 94 96 88 88     COAG:   Recent Labs     01/06/22  1437 10/31/18  0035   INR 1.0 1.0     HEME/ENDO:  Recent Labs     10/05/23  1623 09/24/19  1634   TSH  --  4.16*   HGBA1C 6.1*  --       CARDIAC:   Recent Labs     01/06/22  1437   BNP 29     Recent Labs     01/09/24  1459 10/21/21  0000   CHOL 251* 271*   LDLF 151* 184*   HDL 59.7 51.8   TRIG 204* 175*       I have personally reviewed most recent PCP, cardiology, vascular, and/or podiatry documentation.      Assessment/Plan  79 y.o. female with  MILNER  in the background of dyslipidemia and hypertension.    Unclear source of MILNER - PFTs were normal, though patient continues to wondering if from thoracic cage abnormality/scoliosis. FVC is 102% predicted (PFT 8/26/24).    I spoke with Dr. Azar re: potential iCPET but patient hesitant about traveling downtown.  She does also have some atypical CP sxs though a pharm nuc stress test 2/23 was negative.    Plan:  CTA coronary with Heartflow for MILNER c/f possible anginal equivalent  Depending on results, may need to revisit iCPET eval - Dr. Azar states he is able to see patient at [facility] as outpatient for assessment if needed  May also consider LDL goal <100 v. Lower depending on results  Carotid US for dizziness   Follow up TBD after above testing         Milind Alicia MD, FACC, FSCAI, RPVI  Co-Director, Vascular Center, and  Co-Director, Pulmonary Embolism Response Team,   [facility] [place]                                 of Medicine,                                                                 [facility] [employer]

## 2024-12-19 NOTE — PROGRESS NOTES
PCP: Darshan White, DO    Subjective   Silvia AGUSTO Damico is a 79 y.o. female who complains of  MILNER. Patient is wife of one of my patients (Amrit Damico).    Seen by orthopedics for back scoliosis - thoracic cavity is rolling fwd.  PFT done August - normal. Patient wonders if from thoracic cavity changes.    Additional complaints include:  Gnawing/muscle pain in mid chest when she wakes up in morning.   Out of nowhere she gets lightheaded; not associated with positional change.    Review of Systems:  Otherwise, limited cardiovascular review of systems is negative.    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), Encounter for screening for malignant neoplasm of colon (01/16/2019), Encounter for screening for malignant neoplasm of colon (03/13/2019), Other abnormal and inconclusive findings on diagnostic imaging of breast (06/16/2017), Overweight (01/27/2022), Personal history of diseases of the skin and subcutaneous tissue (06/13/2016), Personal history of other diseases of the circulatory system (10/21/2021), Personal history of other diseases of the musculoskeletal system and connective tissue, Personal history of other infectious and parasitic diseases, Personal history of other infectious and parasitic diseases (09/09/2022), and Personal history of other specified conditions.    Surgical History:   She has a past surgical history that includes Appendectomy (05/26/2016) and Hysterectomy (05/26/2016).    Family History:   Family History   Problem Relation Name Age of Onset    Coronary artery disease Father      Other (gastic carcinoma) Other      Diabetes Sibling         Social History:   Tobacco Use: Medium Risk (12/19/2024)    Patient History     Smoking Tobacco Use: Former     Smokeless Tobacco Use: Never     Passive Exposure: Not on file       Outpatient Medications:    Current Outpatient  "Medications:     amLODIPine (Norvasc) 5 mg tablet, Take 1 tablet (5 mg) by mouth once daily., Disp: 90 tablet, Rfl: 0    cholecalciferol (Vitamin D-3) 125 MCG (5000 UT) capsule, Take by mouth., Disp: , Rfl:     clobetasoL 0.05 % lotion, twice a day., Disp: , Rfl:     ketoconazole (NIZOral) 2 % shampoo, Apply topically every other day. Apply to scalp every other day for 5 minutes and rinse, Disp: 120 mL, Rfl: 2    krill-omega-3-dha-epa-lipids 741-86-85-50 mg capsule, Take 2 tablets by mouth once daily., Disp: , Rfl:     Lactobacillus acidophilus (PROBIOTIC ORAL), Take 1 tablet by mouth once daily., Disp: , Rfl:     pantoprazole (ProtoNix) 20 mg EC tablet, TAKE 1 TABLET DAILY, Disp: 90 tablet, Rfl: 3    predniSONE (Deltasone) 10 mg tablet, 1 tablet 3 times daily for 5 days then 1 tablet twice daily for 5 days then 1 tablet daily for 5 days, Disp: 30 tablet, Rfl: 0  No current facility-administered medications for this visit.     Allergies:  Cephalexin       Objective   Vital Signs:  /86 (BP Location: Left arm, Patient Position: Sitting, BP Cuff Size: Adult)   Pulse 83   Ht 1.6 m (5' 3\")   Wt 81.7 kg (180 lb 3.2 oz)   SpO2 100%   BMI 31.92 kg/m²     Physical Exam:  General: no acute distress  HEENT: EOMI, no scleral icterus.  Lungs: Clear to auscultation bilaterally without wheezing, rales, or rhonchi.  Cardiovascular: Regular rhythm and rate. Normal S1 and S2. No murmurs, rubs, or gallops are appreciated. JVP normal.  no bruits  Abdomen: Soft, nontender, nondistended. Bowel sounds present.  Extremities: warm with 1+ distal pulses.  Neurologic: Alert and oriented x3.    Pertinent Recent Cardiovascular Studies (personally reviewed):  Cardiac studies:  Echocardiogram 9/24/24:  normal EF with impaired relaxation. AV mild sclerosis with trivial AI. RV is normal with normal RVSP.  IVC is collapsible.     Laboratory values:  CMP:  Recent Labs     01/09/24  1459 10/05/23  1623 03/06/23  1334 02/14/23  1414 " 11/01/22  1504 01/27/22  1303 01/14/22  0518 01/13/22  0548 01/11/22  0523 01/09/22  0507 01/08/22  0532 10/21/21  0000 09/24/19  1634    137 138 137 139 138 135* 136   < > 136 138   < > 139   K 4.5 4.6 4.3 4.0 4.3 4.3 4.3 4.2   < > 4.4 4.7   < > 4.2    101 104 103 103 99 100 100   < > 105 106   < > 103   CO2 30 26 25 27 27 29 28 26   < > 23 22   < > 30   ANIONGAP 13 15 13 11 13 14 11 14   < > 12 15   < > 10   BUN 30* 28* 27* 33* 24* 30* 19 16   < > 15 18   < > 24*   CREATININE 1.25* 0.95 1.09* 1.03 1.03 0.81 0.79 0.54   < > 0.65 0.66   < > 0.85   EGFR 44* 62  --   --   --   --   --   --   --   --   --   --   --    MG 2.15  --   --   --   --   --   --   --   --  1.83 1.59*  --  2.31    < > = values in this interval not displayed.     Recent Labs     01/09/24  1459 10/05/23  1623 11/01/22  1504 01/27/22  1303 01/14/22  0518 09/24/19  1634 10/31/18  0035   ALBUMIN 4.0 4.3 3.8 3.3* 3.0*   < > 4.2   ALKPHOS 92 100 100 77 62   < > 102   ALT 14 17 15 17 33   < > 17   AST 14 15 16 12 21   < > 16   BILITOT 0.3 0.2 0.3 0.3 0.4   < > 0.3   LIPASE  --   --   --   --   --   --  25    < > = values in this interval not displayed.     CBC:  Recent Labs     01/09/24  1459 10/05/23  1623 03/06/23  1334 02/14/23  1414 11/01/22  1504 01/27/22  1303 01/14/22  0518 01/13/22  0548   WBC 8.5 8.4 9.0 8.7 9.0 10.5 5.8 4.1*   HGB 12.2 12.5 12.2 11.9* 12.4 12.7 12.4 12.2   HCT 39.9 40.5 38.8 37.5 39.4 40.4 36.8 36.0   * 165 205 270 146* 122* 233 200   MCV 92 94 92 90 94 96 88 88     COAG:   Recent Labs     01/06/22  1437 10/31/18  0035   INR 1.0 1.0     HEME/ENDO:  Recent Labs     10/05/23  1623 09/24/19  1634   TSH  --  4.16*   HGBA1C 6.1*  --       CARDIAC:   Recent Labs     01/06/22  1437   BNP 29     Recent Labs     01/09/24  1459 10/21/21  0000   CHOL 251* 271*   LDLF 151* 184*   HDL 59.7 51.8   TRIG 204* 175*       I have personally reviewed most recent PCP, cardiology, vascular, and/or podiatry  documentation.      Assessment/Plan   79 y.o. female with  MILNER  in the background of dyslipidemia and hypertension.    Unclear source of MILNER - PFTs were normal, though patient continues to wondering if from thoracic cage abnormality/scoliosis. FVC is 102% predicted (PFT 8/26/24).    I spoke with Dr. Azar re: potential iCPET but patient hesitant about traveling downtown.  She does also have some atypical CP sxs though a pharm nuc stress test 2/23 was negative.    Plan:  CTA coronary with Heartflow for MILNER c/f possible anginal equivalent  Depending on results, may need to revisit iCPET eval - Dr. Azar states he is able to see patient at [facility] as outpatient for assessment if needed  May also consider LDL goal <100 v. Lower depending on results  Carotid US for dizziness   Follow up TBD after above testing         Milind Alicia MD, FACC, FSCAI, RPVI  Co-Director, Vascular Center, and  Co-Director, Pulmonary Embolism Response Team,   [facility] [place]                                 of Medicine,                                                                 [facility] [employer]

## 2024-12-19 NOTE — PATIENT INSTRUCTIONS
We will get CT scan of the heart arteries  Carotid ultrasound    May want to consider referral to Dr. Azar

## 2024-12-28 RX ORDER — METOPROLOL TARTRATE 100 MG/1
100 TABLET ORAL SEE ADMIN INSTRUCTIONS
Qty: 1 TABLET | Refills: 0 | Status: SHIPPED | OUTPATIENT
Start: 2024-12-28

## 2025-01-07 DIAGNOSIS — K21.9 GASTROESOPHAGEAL REFLUX DISEASE WITHOUT ESOPHAGITIS: ICD-10-CM

## 2025-01-07 RX ORDER — PANTOPRAZOLE SODIUM 20 MG/1
20 TABLET, DELAYED RELEASE ORAL DAILY
Qty: 90 TABLET | Refills: 3 | Status: SHIPPED | OUTPATIENT
Start: 2025-01-07

## 2025-01-16 ENCOUNTER — APPOINTMENT (OUTPATIENT)
Dept: PRIMARY CARE | Facility: CLINIC | Age: 80
End: 2025-01-16
Payer: MEDICARE

## 2025-01-16 VITALS
BODY MASS INDEX: 31.57 KG/M2 | HEART RATE: 91 BPM | DIASTOLIC BLOOD PRESSURE: 78 MMHG | TEMPERATURE: 97.3 F | HEIGHT: 63 IN | OXYGEN SATURATION: 96 % | WEIGHT: 178.2 LBS | SYSTOLIC BLOOD PRESSURE: 120 MMHG

## 2025-01-16 DIAGNOSIS — M34.9 SCLERODERMA (MULTI): ICD-10-CM

## 2025-01-16 DIAGNOSIS — J84.10 PULMONARY FIBROSIS (MULTI): ICD-10-CM

## 2025-01-16 DIAGNOSIS — E78.5 HYPERLIPIDEMIA, UNSPECIFIED HYPERLIPIDEMIA TYPE: ICD-10-CM

## 2025-01-16 DIAGNOSIS — I27.29 OTHER SECONDARY PULMONARY HYPERTENSION: ICD-10-CM

## 2025-01-16 DIAGNOSIS — I10 BENIGN HYPERTENSION: ICD-10-CM

## 2025-01-16 DIAGNOSIS — Z00.00 MEDICARE ANNUAL WELLNESS VISIT, SUBSEQUENT: Primary | ICD-10-CM

## 2025-01-16 DIAGNOSIS — U09.9 LONG COVID: ICD-10-CM

## 2025-01-16 LAB
ALBUMIN SERPL BCP-MCNC: 4.1 G/DL (ref 3.4–5)
ALP SERPL-CCNC: 93 U/L (ref 33–136)
ALT SERPL W P-5'-P-CCNC: 13 U/L (ref 7–45)
ANION GAP SERPL CALC-SCNC: 14 MMOL/L (ref 10–20)
AST SERPL W P-5'-P-CCNC: 17 U/L (ref 9–39)
BASOPHILS # BLD AUTO: 0.07 X10*3/UL (ref 0–0.1)
BASOPHILS NFR BLD AUTO: 0.8 %
BILIRUB SERPL-MCNC: 0.3 MG/DL (ref 0–1.2)
BUN SERPL-MCNC: 30 MG/DL (ref 6–23)
CALCIUM SERPL-MCNC: 9.9 MG/DL (ref 8.6–10.6)
CHLORIDE SERPL-SCNC: 102 MMOL/L (ref 98–107)
CHOLEST SERPL-MCNC: 267 MG/DL (ref 0–199)
CHOLESTEROL/HDL RATIO: 3.9
CO2 SERPL-SCNC: 28 MMOL/L (ref 21–32)
CREAT SERPL-MCNC: 1.05 MG/DL (ref 0.5–1.05)
EGFRCR SERPLBLD CKD-EPI 2021: 54 ML/MIN/1.73M*2
EOSINOPHIL # BLD AUTO: 0.18 X10*3/UL (ref 0–0.4)
EOSINOPHIL NFR BLD AUTO: 2.2 %
ERYTHROCYTE [DISTWIDTH] IN BLOOD BY AUTOMATED COUNT: 15.9 % (ref 11.5–14.5)
GLUCOSE SERPL-MCNC: 92 MG/DL (ref 74–99)
HCT VFR BLD AUTO: 40.9 % (ref 36–46)
HDLC SERPL-MCNC: 67.9 MG/DL
HGB BLD-MCNC: 12.8 G/DL (ref 12–16)
IMM GRANULOCYTES # BLD AUTO: 0.04 X10*3/UL (ref 0–0.5)
IMM GRANULOCYTES NFR BLD AUTO: 0.5 % (ref 0–0.9)
LDLC SERPL CALC-MCNC: 170 MG/DL
LYMPHOCYTES # BLD AUTO: 2.54 X10*3/UL (ref 0.8–3)
LYMPHOCYTES NFR BLD AUTO: 30.6 %
MCH RBC QN AUTO: 29 PG (ref 26–34)
MCHC RBC AUTO-ENTMCNC: 31.3 G/DL (ref 32–36)
MCV RBC AUTO: 93 FL (ref 80–100)
MONOCYTES # BLD AUTO: 0.63 X10*3/UL (ref 0.05–0.8)
MONOCYTES NFR BLD AUTO: 7.6 %
NEUTROPHILS # BLD AUTO: 4.83 X10*3/UL (ref 1.6–5.5)
NEUTROPHILS NFR BLD AUTO: 58.3 %
NON HDL CHOLESTEROL: 199 MG/DL (ref 0–149)
NRBC BLD-RTO: 0 /100 WBCS (ref 0–0)
PHOSPHATE SERPL-MCNC: 3.1 MG/DL (ref 2.5–4.9)
PLATELET # BLD AUTO: 99 X10*3/UL (ref 150–450)
POTASSIUM SERPL-SCNC: 4.7 MMOL/L (ref 3.5–5.3)
PROT SERPL-MCNC: 7.4 G/DL (ref 6.4–8.2)
RBC # BLD AUTO: 4.42 X10*6/UL (ref 4–5.2)
SODIUM SERPL-SCNC: 139 MMOL/L (ref 136–145)
TRIGL SERPL-MCNC: 147 MG/DL (ref 0–149)
VLDL: 29 MG/DL (ref 0–40)
WBC # BLD AUTO: 8.3 X10*3/UL (ref 4.4–11.3)

## 2025-01-16 PROCEDURE — 99497 ADVNCD CARE PLAN 30 MIN: CPT | Performed by: FAMILY MEDICINE

## 2025-01-16 PROCEDURE — 3078F DIAST BP <80 MM HG: CPT | Performed by: FAMILY MEDICINE

## 2025-01-16 PROCEDURE — 80061 LIPID PANEL: CPT

## 2025-01-16 PROCEDURE — 85025 COMPLETE CBC W/AUTO DIFF WBC: CPT

## 2025-01-16 PROCEDURE — 3074F SYST BP LT 130 MM HG: CPT | Performed by: FAMILY MEDICINE

## 2025-01-16 PROCEDURE — 1159F MED LIST DOCD IN RCRD: CPT | Performed by: FAMILY MEDICINE

## 2025-01-16 PROCEDURE — 1036F TOBACCO NON-USER: CPT | Performed by: FAMILY MEDICINE

## 2025-01-16 PROCEDURE — 84100 ASSAY OF PHOSPHORUS: CPT

## 2025-01-16 PROCEDURE — 99214 OFFICE O/P EST MOD 30 MIN: CPT | Performed by: FAMILY MEDICINE

## 2025-01-16 PROCEDURE — G0439 PPPS, SUBSEQ VISIT: HCPCS | Performed by: FAMILY MEDICINE

## 2025-01-16 PROCEDURE — 1160F RVW MEDS BY RX/DR IN RCRD: CPT | Performed by: FAMILY MEDICINE

## 2025-01-16 PROCEDURE — 1125F AMNT PAIN NOTED PAIN PRSNT: CPT | Performed by: FAMILY MEDICINE

## 2025-01-16 PROCEDURE — 80053 COMPREHEN METABOLIC PANEL: CPT

## 2025-01-16 ASSESSMENT — ENCOUNTER SYMPTOMS
LOSS OF SENSATION IN FEET: 0
PSYCHIATRIC NEGATIVE: 1
BACK PAIN: 1
NEUROLOGICAL NEGATIVE: 1
DEPRESSION: 0
SHORTNESS OF BREATH: 1
GASTROINTESTINAL NEGATIVE: 1
CARDIOVASCULAR NEGATIVE: 1
OCCASIONAL FEELINGS OF UNSTEADINESS: 0
ENDOCRINE NEGATIVE: 1
ARTHRALGIAS: 1
HALLUCINATIONS: 0
CONSTITUTIONAL NEGATIVE: 1

## 2025-01-16 ASSESSMENT — PAIN SCALES - GENERAL: PAINLEVEL_OUTOF10: 6

## 2025-01-16 NOTE — PROGRESS NOTES
"Subjective   Patient ID: Silvia Damico is a 79 y.o. female who presents for Annual Exam (Assessment annual medicare wellness fasting bw).    HPI     Review of Systems   Constitutional: Negative.    Respiratory:  Positive for shortness of breath.         Pulmonology O2 dependent Dr Jay   Cardiovascular: Negative.         Dr Alicia   Gastrointestinal: Negative.    Endocrine: Negative.    Genitourinary: Negative.    Musculoskeletal:  Positive for arthralgias and back pain.        Pain managment   Neurological: Negative.    Psychiatric/Behavioral: Negative.  Negative for hallucinations.        Objective   /78 (BP Location: Left arm)   Pulse 91   Temp 36.3 °C (97.3 °F) (Temporal)   Ht 1.6 m (5' 3\")   Wt 80.8 kg (178 lb 3.2 oz)   SpO2 96%   BMI 31.57 kg/m²     Physical Exam  Vitals and nursing note reviewed.   Constitutional:       Appearance: Normal appearance.   HENT:      Right Ear: Tympanic membrane normal.      Left Ear: Tympanic membrane normal.      Mouth/Throat:      Pharynx: Oropharynx is clear.   Cardiovascular:      Rate and Rhythm: Normal rate and regular rhythm.      Pulses: Normal pulses.      Heart sounds: Normal heart sounds.   Pulmonary:      Breath sounds: Normal breath sounds.   Abdominal:      Palpations: Abdomen is soft.   Musculoskeletal:      Comments: DJD knees scoliosis   Neurological:      General: No focal deficit present.      Mental Status: She is alert and oriented to person, place, and time.   Psychiatric:         Mood and Affect: Mood normal.         Behavior: Behavior normal.         Assessment/Plan patient seen here for an annual Medicare wellness exam.  We reviewed her questionnaire she is agreeable to her responses.  We did discuss advanced directives.  She has no significant difficulty with depression or anxiety.  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

## 2025-01-16 NOTE — ACP (ADVANCE CARE PLANNING)
Confirming Previous Code Status:   Advance Care Planning Note     Discussion Date: 01/16/25   Discussion Participants: patient    The patient wishes to discuss Advance Care Planning today and the following is a brief summary of our discussion.     Patient has capacity to make their own medical decisions: Yes  Health Care Agent/Surrogate Decision Maker documented in chart: Yes    Documents on file and valid:  Advance Directive/Living Will: Yes   Health Care Power of : Yes  Other: none    Communication of Medical Status/Prognosis:   yes     Communication of Treatment Goals/Options:   yes     Treatment Decisions  Goals of Care: survival is paramount regardless of prognosis, treatment outcome, or burden   yes  Follow Up Plan  no  Team Members  myself  Time Statement: Total face to face time spent on advance care planning was 16 minutes with 16 minutes spent in counseling, including the explanation.    Darshan White,   1/16/2025 12:29 PM

## 2025-01-17 ENCOUNTER — HOSPITAL ENCOUNTER (OUTPATIENT)
Dept: VASCULAR MEDICINE | Facility: CLINIC | Age: 80
Discharge: HOME | End: 2025-01-17
Payer: MEDICARE

## 2025-01-17 DIAGNOSIS — R42 LIGHTHEADED: ICD-10-CM

## 2025-01-17 PROCEDURE — 93880 EXTRACRANIAL BILAT STUDY: CPT

## 2025-01-17 PROCEDURE — 93880 EXTRACRANIAL BILAT STUDY: CPT | Performed by: SURGERY

## 2025-01-20 ENCOUNTER — TELEPHONE (OUTPATIENT)
Dept: CARDIOLOGY | Facility: CLINIC | Age: 80
End: 2025-01-20
Payer: MEDICARE

## 2025-01-20 NOTE — TELEPHONE ENCOUNTER
----- Message from Milind Alicia sent at 1/20/2025  9:02 AM EST -----  Regarding: Results reviewed  Moderate disease on R internal carotid; not cause of dizziness. Can monitor yearly.  CT coronary pending.    Thank you!  ----- Message -----  From: Interface, Syngo - Cardiology Results In  Sent: 1/17/2025   6:24 PM EST  To: Milind Alicia MD

## 2025-01-21 ENCOUNTER — HOSPITAL ENCOUNTER (OUTPATIENT)
Dept: RADIOLOGY | Facility: HOSPITAL | Age: 80
Discharge: HOME | End: 2025-01-21
Payer: MEDICARE

## 2025-01-21 ENCOUNTER — PREP FOR PROCEDURE (OUTPATIENT)
Dept: CARDIOLOGY | Facility: HOSPITAL | Age: 80
End: 2025-01-21
Payer: MEDICARE

## 2025-01-21 VITALS
DIASTOLIC BLOOD PRESSURE: 66 MMHG | HEART RATE: 66 BPM | SYSTOLIC BLOOD PRESSURE: 133 MMHG | RESPIRATION RATE: 18 BRPM | OXYGEN SATURATION: 95 %

## 2025-01-21 DIAGNOSIS — D69.6 THROMBOCYTOPENIA (CMS-HCC): ICD-10-CM

## 2025-01-21 DIAGNOSIS — J44.1 ACUTE EXACERBATION OF CHRONIC OBSTRUCTIVE PULMONARY DISEASE (MULTI): ICD-10-CM

## 2025-01-21 DIAGNOSIS — R07.9 CHEST PAIN: ICD-10-CM

## 2025-01-21 DIAGNOSIS — J12.82 PNEUMONIA DUE TO COVID-19 VIRUS: ICD-10-CM

## 2025-01-21 DIAGNOSIS — I20.89 ANGINAL EQUIVALENT (CMS-HCC): Primary | ICD-10-CM

## 2025-01-21 DIAGNOSIS — R06.02 SOB (SHORTNESS OF BREATH): ICD-10-CM

## 2025-01-21 DIAGNOSIS — U07.1 PNEUMONIA DUE TO COVID-19 VIRUS: ICD-10-CM

## 2025-01-21 DIAGNOSIS — M34.1 CREST (CALCINOSIS, RAYNAUD'S PHENOMENON, ESOPHAGEAL DYSFUNCTION, SCLERODACTYLY, TELANGIECTASIA) (MULTI): ICD-10-CM

## 2025-01-21 DIAGNOSIS — I27.0 PRIMARY PULMONARY HYPERTENSION (MULTI): ICD-10-CM

## 2025-01-21 DIAGNOSIS — I10 BENIGN HYPERTENSION: ICD-10-CM

## 2025-01-21 PROCEDURE — 2500000004 HC RX 250 GENERAL PHARMACY W/ HCPCS (ALT 636 FOR OP/ED): Performed by: RADIOLOGY

## 2025-01-21 PROCEDURE — 2500000001 HC RX 250 WO HCPCS SELF ADMINISTERED DRUGS (ALT 637 FOR MEDICARE OP): Performed by: RADIOLOGY

## 2025-01-21 PROCEDURE — 75574 CT ANGIO HRT W/3D IMAGE: CPT

## 2025-01-21 PROCEDURE — 75574 CT ANGIO HRT W/3D IMAGE: CPT | Performed by: RADIOLOGY

## 2025-01-21 PROCEDURE — 75571 CT HRT W/O DYE W/CA TEST: CPT

## 2025-01-21 PROCEDURE — 2550000001 HC RX 255 CONTRASTS: Performed by: STUDENT IN AN ORGANIZED HEALTH CARE EDUCATION/TRAINING PROGRAM

## 2025-01-21 RX ORDER — NITROGLYCERIN 0.4 MG/1
0.8 TABLET SUBLINGUAL ONCE
Status: COMPLETED | OUTPATIENT
Start: 2025-01-21 | End: 2025-01-21

## 2025-01-21 RX ORDER — METOPROLOL TARTRATE 1 MG/ML
5 INJECTION, SOLUTION INTRAVENOUS EVERY 5 MIN PRN
Status: SHIPPED | OUTPATIENT
Start: 2025-01-21

## 2025-01-21 RX ORDER — ASPIRIN 325 MG
325 TABLET ORAL ONCE
OUTPATIENT
Start: 2025-01-21 | End: 2025-01-21

## 2025-01-21 RX ADMIN — IOHEXOL 160 ML: 350 INJECTION, SOLUTION INTRAVENOUS at 14:34

## 2025-01-21 RX ADMIN — METOPROLOL TARTRATE 5 MG: 5 INJECTION INTRAVENOUS at 13:32

## 2025-01-21 RX ADMIN — NITROGLYCERIN 0.8 MG: 0.4 TABLET SUBLINGUAL at 14:13

## 2025-01-21 RX ADMIN — METOPROLOL TARTRATE 5 MG: 5 INJECTION INTRAVENOUS at 13:46

## 2025-01-21 RX ADMIN — METOPROLOL TARTRATE 5 MG: 5 INJECTION INTRAVENOUS at 13:37

## 2025-01-21 RX ADMIN — METOPROLOL TARTRATE 5 MG: 5 INJECTION INTRAVENOUS at 13:51

## 2025-01-21 RX ADMIN — METOPROLOL TARTRATE 5 MG: 5 INJECTION INTRAVENOUS at 13:57

## 2025-01-21 ASSESSMENT — PAIN - FUNCTIONAL ASSESSMENT: PAIN_FUNCTIONAL_ASSESSMENT: 0-10

## 2025-01-21 ASSESSMENT — PAIN SCALES - GENERAL: PAINLEVEL_OUTOF10: 0 - NO PAIN

## 2025-01-21 NOTE — H&P
CT scan reviewed, relatively nondiagnostic with significant calcium. Will plan for L/RHC/coronary angiogram for concerns of dyspnea on exertion/anginal equivalent. Spoke with patient.    Milind Alicia MD, FACC, [facility], RPVI  Co-Director, Vascular Center, and  Co-Director, Pulmonary Embolism Response Team,   [facility] [place]                                 of Medicine,                                                                 [facility] [employer]

## 2025-01-22 DIAGNOSIS — M47.817 SPONDYLOSIS WITHOUT MYELOPATHY OR RADICULOPATHY, LUMBOSACRAL REGION: Primary | ICD-10-CM

## 2025-01-22 PROBLEM — I20.89 ANGINAL EQUIVALENT (CMS-HCC): Status: ACTIVE | Noted: 2025-01-21

## 2025-02-07 ENCOUNTER — LAB (OUTPATIENT)
Dept: LAB | Facility: HOSPITAL | Age: 80
End: 2025-02-07
Payer: MEDICARE

## 2025-02-07 DIAGNOSIS — I20.89 ANGINAL EQUIVALENT (CMS-HCC): ICD-10-CM

## 2025-02-07 LAB
ANION GAP SERPL CALC-SCNC: 15 MMOL/L (ref 10–20)
BUN SERPL-MCNC: 29 MG/DL (ref 6–23)
CALCIUM SERPL-MCNC: 10.3 MG/DL (ref 8.6–10.6)
CHLORIDE SERPL-SCNC: 101 MMOL/L (ref 98–107)
CO2 SERPL-SCNC: 27 MMOL/L (ref 21–32)
CREAT SERPL-MCNC: 1.17 MG/DL (ref 0.5–1.05)
EGFRCR SERPLBLD CKD-EPI 2021: 48 ML/MIN/1.73M*2
ERYTHROCYTE [DISTWIDTH] IN BLOOD BY AUTOMATED COUNT: 15.9 % (ref 11.5–14.5)
GLUCOSE SERPL-MCNC: 173 MG/DL (ref 74–99)
HCT VFR BLD AUTO: 38.3 % (ref 36–46)
HGB BLD-MCNC: 12.1 G/DL (ref 12–16)
MCH RBC QN AUTO: 28.9 PG (ref 26–34)
MCHC RBC AUTO-ENTMCNC: 31.6 G/DL (ref 32–36)
MCV RBC AUTO: 92 FL (ref 80–100)
NRBC BLD-RTO: 0 /100 WBCS (ref 0–0)
PLATELET # BLD AUTO: 174 X10*3/UL (ref 150–450)
POTASSIUM SERPL-SCNC: 4.4 MMOL/L (ref 3.5–5.3)
RBC # BLD AUTO: 4.18 X10*6/UL (ref 4–5.2)
SODIUM SERPL-SCNC: 139 MMOL/L (ref 136–145)
WBC # BLD AUTO: 7.8 X10*3/UL (ref 4.4–11.3)

## 2025-02-07 PROCEDURE — 85027 COMPLETE CBC AUTOMATED: CPT

## 2025-02-07 PROCEDURE — 80048 BASIC METABOLIC PNL TOTAL CA: CPT

## 2025-02-10 ENCOUNTER — HOSPITAL ENCOUNTER (OUTPATIENT)
Facility: HOSPITAL | Age: 80
Setting detail: OUTPATIENT SURGERY
Discharge: HOME | End: 2025-02-10
Attending: INTERNAL MEDICINE | Admitting: INTERNAL MEDICINE
Payer: MEDICARE

## 2025-02-10 ENCOUNTER — HOSPITAL ENCOUNTER (OUTPATIENT)
Dept: CARDIOLOGY | Facility: HOSPITAL | Age: 80
Discharge: HOME | End: 2025-02-10

## 2025-02-10 VITALS
DIASTOLIC BLOOD PRESSURE: 82 MMHG | BODY MASS INDEX: 31.6 KG/M2 | OXYGEN SATURATION: 98 % | SYSTOLIC BLOOD PRESSURE: 134 MMHG | TEMPERATURE: 97.2 F | HEART RATE: 84 BPM | HEIGHT: 63 IN | WEIGHT: 178.35 LBS | RESPIRATION RATE: 12 BRPM

## 2025-02-10 DIAGNOSIS — I20.89 ANGINAL EQUIVALENT (CMS-HCC): ICD-10-CM

## 2025-02-10 DIAGNOSIS — I73.9 PERIPHERAL VASCULAR DISEASE, UNSPECIFIED (CMS-HCC): ICD-10-CM

## 2025-02-10 DIAGNOSIS — R06.09 OTHER FORMS OF DYSPNEA: ICD-10-CM

## 2025-02-10 DIAGNOSIS — I20.9 ANGINA PECTORIS, UNSPECIFIED: ICD-10-CM

## 2025-02-10 DIAGNOSIS — R06.02 SOB (SHORTNESS OF BREATH): Primary | ICD-10-CM

## 2025-02-10 LAB
ACT BLD: 239 SEC (ref 83–199)
ACT BLD: 269 SEC (ref 83–199)
BASE EXCESS BLDA CALC-SCNC: 2.4 MMOL/L (ref -2–3)
BASE EXCESS BLDMV CALC-SCNC: 5.8 MMOL/L (ref -2–3)
BODY TEMPERATURE: 37 DEGREES CELSIUS
BODY TEMPERATURE: 37 DEGREES CELSIUS
HCO3 BLDA-SCNC: 26.4 MMOL/L (ref 22–26)
HCO3 BLDMV-SCNC: 30.6 MMOL/L (ref 22–26)
INHALED O2 CONCENTRATION: 32 %
INHALED O2 CONCENTRATION: 32 %
OXYHGB MFR BLDA: 96.1 % (ref 94–98)
OXYHGB MFR BLDMV: 75.6 % (ref 45–75)
PCO2 BLDA: 38 MM HG (ref 38–42)
PCO2 BLDMV: 44 MM HG (ref 41–51)
PH BLDA: 7.45 PH (ref 7.38–7.42)
PH BLDMV: 7.45 PH (ref 7.33–7.43)
PO2 BLDA: 94 MM HG (ref 85–95)
PO2 BLDMV: 43 MM HG (ref 35–45)
SAO2 % BLDA: 99 % (ref 94–100)
SAO2 % BLDMV: 78 % (ref 45–75)
SITE OF ARTERIAL PUNCTURE: ABNORMAL

## 2025-02-10 PROCEDURE — 93460 R&L HRT ART/VENTRICLE ANGIO: CPT | Performed by: INTERNAL MEDICINE

## 2025-02-10 PROCEDURE — 7100000009 HC PHASE TWO TIME - INITIAL BASE CHARGE: Performed by: INTERNAL MEDICINE

## 2025-02-10 PROCEDURE — 82805 BLOOD GASES W/O2 SATURATION: CPT

## 2025-02-10 PROCEDURE — 99153 MOD SED SAME PHYS/QHP EA: CPT | Performed by: INTERNAL MEDICINE

## 2025-02-10 PROCEDURE — C1760 CLOSURE DEV, VASC: HCPCS | Performed by: INTERNAL MEDICINE

## 2025-02-10 PROCEDURE — 96373 THER/PROPH/DIAG INJ IA: CPT | Performed by: INTERNAL MEDICINE

## 2025-02-10 PROCEDURE — 2780000003 HC OR 278 NO HCPCS: Performed by: INTERNAL MEDICINE

## 2025-02-10 PROCEDURE — 99152 MOD SED SAME PHYS/QHP 5/>YRS: CPT | Performed by: INTERNAL MEDICINE

## 2025-02-10 PROCEDURE — 93005 ELECTROCARDIOGRAM TRACING: CPT

## 2025-02-10 PROCEDURE — C1887 CATHETER, GUIDING: HCPCS | Performed by: INTERNAL MEDICINE

## 2025-02-10 PROCEDURE — 2500000005 HC RX 250 GENERAL PHARMACY W/O HCPCS: Performed by: INTERNAL MEDICINE

## 2025-02-10 PROCEDURE — 2500000004 HC RX 250 GENERAL PHARMACY W/ HCPCS (ALT 636 FOR OP/ED): Performed by: INTERNAL MEDICINE

## 2025-02-10 PROCEDURE — 93010 ELECTROCARDIOGRAM REPORT: CPT | Performed by: STUDENT IN AN ORGANIZED HEALTH CARE EDUCATION/TRAINING PROGRAM

## 2025-02-10 PROCEDURE — 2500000001 HC RX 250 WO HCPCS SELF ADMINISTERED DRUGS (ALT 637 FOR MEDICARE OP): Performed by: INTERNAL MEDICINE

## 2025-02-10 PROCEDURE — 2720000007 HC OR 272 NO HCPCS: Performed by: INTERNAL MEDICINE

## 2025-02-10 PROCEDURE — C1769 GUIDE WIRE: HCPCS | Performed by: INTERNAL MEDICINE

## 2025-02-10 PROCEDURE — 93571 IV DOP VEL&/PRESS C FLO 1ST: CPT | Mod: LD | Performed by: INTERNAL MEDICINE

## 2025-02-10 PROCEDURE — C1894 INTRO/SHEATH, NON-LASER: HCPCS | Performed by: INTERNAL MEDICINE

## 2025-02-10 PROCEDURE — 2550000001 HC RX 255 CONTRASTS: Performed by: INTERNAL MEDICINE

## 2025-02-10 PROCEDURE — 82810 BLOOD GASES O2 SAT ONLY: CPT

## 2025-02-10 PROCEDURE — 93571 IV DOP VEL&/PRESS C FLO 1ST: CPT | Performed by: INTERNAL MEDICINE

## 2025-02-10 PROCEDURE — 7100000010 HC PHASE TWO TIME - EACH INCREMENTAL 1 MINUTE: Performed by: INTERNAL MEDICINE

## 2025-02-10 PROCEDURE — 85347 COAGULATION TIME ACTIVATED: CPT

## 2025-02-10 RX ORDER — HEPARIN SODIUM 1000 [USP'U]/ML
INJECTION, SOLUTION INTRAVENOUS; SUBCUTANEOUS AS NEEDED
Status: DISCONTINUED | OUTPATIENT
Start: 2025-02-10 | End: 2025-02-10 | Stop reason: HOSPADM

## 2025-02-10 RX ORDER — SPIRONOLACTONE 25 MG/1
25 TABLET ORAL DAILY
Qty: 30 TABLET | Refills: 1 | Status: SHIPPED | OUTPATIENT
Start: 2025-02-10 | End: 2025-04-11

## 2025-02-10 RX ORDER — ASPIRIN 325 MG
325 TABLET ORAL DAILY
Status: DISCONTINUED | OUTPATIENT
Start: 2025-02-10 | End: 2025-02-10 | Stop reason: HOSPADM

## 2025-02-10 RX ORDER — FENTANYL CITRATE 50 UG/ML
INJECTION, SOLUTION INTRAMUSCULAR; INTRAVENOUS AS NEEDED
Status: DISCONTINUED | OUTPATIENT
Start: 2025-02-10 | End: 2025-02-10 | Stop reason: HOSPADM

## 2025-02-10 RX ORDER — IODIXANOL 270 MG/ML
INJECTION, SOLUTION INTRAVASCULAR AS NEEDED
Status: DISCONTINUED | OUTPATIENT
Start: 2025-02-10 | End: 2025-02-10 | Stop reason: HOSPADM

## 2025-02-10 RX ORDER — LIDOCAINE HYDROCHLORIDE 10 MG/ML
INJECTION, SOLUTION EPIDURAL; INFILTRATION; INTRACAUDAL; PERINEURAL AS NEEDED
Status: DISCONTINUED | OUTPATIENT
Start: 2025-02-10 | End: 2025-02-10 | Stop reason: HOSPADM

## 2025-02-10 RX ORDER — MIDAZOLAM HYDROCHLORIDE 1 MG/ML
INJECTION, SOLUTION INTRAMUSCULAR; INTRAVENOUS AS NEEDED
Status: DISCONTINUED | OUTPATIENT
Start: 2025-02-10 | End: 2025-02-10 | Stop reason: HOSPADM

## 2025-02-10 RX ORDER — NITROGLYCERIN 40 MG/100ML
INJECTION INTRAVENOUS AS NEEDED
Status: DISCONTINUED | OUTPATIENT
Start: 2025-02-10 | End: 2025-02-10 | Stop reason: HOSPADM

## 2025-02-10 RX ADMIN — ASPIRIN 325 MG ORAL TABLET 325 MG: 325 PILL ORAL at 07:34

## 2025-02-10 ASSESSMENT — COLUMBIA-SUICIDE SEVERITY RATING SCALE - C-SSRS
6. HAVE YOU EVER DONE ANYTHING, STARTED TO DO ANYTHING, OR PREPARED TO DO ANYTHING TO END YOUR LIFE?: NO
2. HAVE YOU ACTUALLY HAD ANY THOUGHTS OF KILLING YOURSELF?: NO
1. IN THE PAST MONTH, HAVE YOU WISHED YOU WERE DEAD OR WISHED YOU COULD GO TO SLEEP AND NOT WAKE UP?: NO

## 2025-02-10 ASSESSMENT — PAIN - FUNCTIONAL ASSESSMENT
PAIN_FUNCTIONAL_ASSESSMENT: 0-10

## 2025-02-10 ASSESSMENT — PAIN SCALES - GENERAL

## 2025-02-10 NOTE — Clinical Note
Closure device placed in the right radial artery. Site closed by radial compression system. 12 ml air placed in the band.

## 2025-02-10 NOTE — NURSING NOTE
Stable.  Right wrist and right brachial site stable. No bleeding or hematoma noted.  Discharge instructions given to patient and , verbalized understanding.  Up at bedside and stable.  Patient to be discharged home via wheelchair to private vehicle in stable condition.

## 2025-02-10 NOTE — DISCHARGE INSTRUCTIONS

## 2025-02-10 NOTE — POST-PROCEDURE NOTE
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            Anticoagulation/Antiplatelet Plan:   NA    Estimated Blood Loss:   5 mL    Anesthesia: Moderate Sedation Anesthesia Staff: No anesthesia staff entered.    Any Specimen(s) Removed:   No specimens collected during this procedure.    Disposition:   Home      Electronically signed by: Milind Alicia MD, 2/10/2025 9:17 AM

## 2025-02-12 LAB
ATRIAL RATE: 92 BPM
P AXIS: 58 DEGREES
P OFFSET: 175 MS
P ONSET: 117 MS
PR INTERVAL: 196 MS
Q ONSET: 215 MS
QRS COUNT: 15 BEATS
QRS DURATION: 76 MS
QT INTERVAL: 362 MS
QTC CALCULATION(BAZETT): 447 MS
QTC FREDERICIA: 417 MS
R AXIS: 8 DEGREES
T AXIS: 85 DEGREES
T OFFSET: 396 MS
VENTRICULAR RATE: 92 BPM

## 2025-02-17 DIAGNOSIS — I73.9 PERIPHERAL VASCULAR DISEASE, UNSPECIFIED (CMS-HCC): ICD-10-CM

## 2025-02-22 LAB
ALBUMIN SERPL-MCNC: 4.1 G/DL (ref 3.6–5.1)
BUN SERPL-MCNC: 24 MG/DL (ref 7–25)
BUN/CREAT SERPL: 23 (CALC) (ref 6–22)
CALCIUM SERPL-MCNC: 9.6 MG/DL (ref 8.6–10.4)
CHLORIDE SERPL-SCNC: 103 MMOL/L (ref 98–110)
CO2 SERPL-SCNC: 26 MMOL/L (ref 20–32)
CREAT SERPL-MCNC: 1.05 MG/DL (ref 0.6–1)
EGFRCR SERPLBLD CKD-EPI 2021: 54 ML/MIN/1.73M2
GLUCOSE SERPL-MCNC: 160 MG/DL (ref 65–99)
PHOSPHATE SERPL-MCNC: 3.5 MG/DL (ref 2.1–4.3)
POTASSIUM SERPL-SCNC: 4.2 MMOL/L (ref 3.5–5.3)
SODIUM SERPL-SCNC: 138 MMOL/L (ref 135–146)

## 2025-02-24 ENCOUNTER — TELEPHONE (OUTPATIENT)
Dept: CARDIOLOGY | Facility: CLINIC | Age: 80
End: 2025-02-24
Payer: MEDICARE

## 2025-02-24 NOTE — TELEPHONE ENCOUNTER
----- Message from Milind Alicia sent at 2/23/2025 12:09 PM EST -----  Regarding: Results reviewed  Potassium stable  Any improvement with spironolactone for shortness of breath?    Thank you!  ----- Message -----  From: Sherrell wikifolio Results In  Sent: 2/22/2025   4:39 AM EST  To: Milind Alicia MD

## 2025-02-24 NOTE — TELEPHONE ENCOUNTER
Patient is aware.  Patient said her ankles are much better.  She said her chest feels better and the SOB has improved.    Only complaint is she said her esophagus is burning like crazy.  She said she is living on Tums since starting Spironolactone.  She states this happens sometimes with medications.      Also sometimes dizziness occurs. They are random.   BP reported since starting medications. 150/82, 163/89, 149/92, 157/86, 143/79, 134/81, 138/84, 130/70, 155/87.    Should she continue medication?  Any other changes in plan of care?

## 2025-03-03 NOTE — TELEPHONE ENCOUNTER
Patient aware Dr. Alicia suggests to take medication with food and patient is taking medication with food.  Symptoms come and go.  She will let us know if symptoms do not improve.

## 2025-03-31 DIAGNOSIS — L21.9 SEBORRHEIC DERMATITIS OF SCALP: ICD-10-CM

## 2025-03-31 RX ORDER — KETOCONAZOLE 20 MG/ML
SHAMPOO, SUSPENSION TOPICAL EVERY OTHER DAY
Qty: 120 ML | Refills: 2 | Status: SHIPPED | OUTPATIENT
Start: 2025-03-31

## 2025-04-08 ENCOUNTER — OFFICE VISIT (OUTPATIENT)
Dept: CARDIOLOGY | Facility: CLINIC | Age: 80
End: 2025-04-08
Payer: MEDICARE

## 2025-04-08 ENCOUNTER — APPOINTMENT (OUTPATIENT)
Dept: CARDIOLOGY | Facility: CLINIC | Age: 80
End: 2025-04-08
Payer: MEDICARE

## 2025-04-08 VITALS
HEIGHT: 63 IN | SYSTOLIC BLOOD PRESSURE: 126 MMHG | DIASTOLIC BLOOD PRESSURE: 58 MMHG | BODY MASS INDEX: 31.43 KG/M2 | HEART RATE: 98 BPM | WEIGHT: 177.4 LBS

## 2025-04-08 DIAGNOSIS — I25.10 CORONARY ARTERY DISEASE INVOLVING NATIVE CORONARY ARTERY OF NATIVE HEART WITHOUT ANGINA PECTORIS: Primary | ICD-10-CM

## 2025-04-08 DIAGNOSIS — I35.8 NON-RHEUMATIC AORTIC SCLEROSIS: ICD-10-CM

## 2025-04-08 DIAGNOSIS — I10 BENIGN HYPERTENSION: ICD-10-CM

## 2025-04-08 DIAGNOSIS — R06.02 SOB (SHORTNESS OF BREATH): ICD-10-CM

## 2025-04-08 DIAGNOSIS — I27.20 PULMONARY HTN (MULTI): ICD-10-CM

## 2025-04-08 DIAGNOSIS — I65.29 CAROTID ATHEROSCLEROSIS, UNSPECIFIED LATERALITY: ICD-10-CM

## 2025-04-08 DIAGNOSIS — E78.2 MIXED HYPERLIPIDEMIA: ICD-10-CM

## 2025-04-08 PROCEDURE — 99215 OFFICE O/P EST HI 40 MIN: CPT

## 2025-04-08 RX ORDER — NAPROXEN SODIUM 220 MG/1
81 TABLET, FILM COATED ORAL DAILY
Qty: 90 TABLET | Refills: 3 | Status: SHIPPED | OUTPATIENT
Start: 2025-04-08 | End: 2026-04-08

## 2025-04-08 RX ORDER — SPIRONOLACTONE 25 MG/1
25 TABLET ORAL DAILY
Qty: 90 TABLET | Refills: 3 | Status: SHIPPED | OUTPATIENT
Start: 2025-04-08 | End: 2026-04-08

## 2025-04-08 NOTE — PATIENT INSTRUCTIONS
Check Bp at home and keep log.    Blood work before you come back to see me.    Fasting labs prior to apt with Dr. Alicia.    Follow up with me in 1 month.     Please start taking baby aspirin 81mg daily.

## 2025-04-08 NOTE — PROGRESS NOTES
"Chief Complaint:   New Patient Visit (2 mo had cath )     History Of Present Illness:    Silvia Damico is a 79 y.o. female with PMHx of CAD, HTN, HLD, thrombocytopenia, angina, scleroderma, presenting today for follow-up s/p [facility] on 2/10/2025 with Dr. Alicia.  She initially had some GI upset with GERD after starting spironolactone, however reports her symptoms have now resolved. She denies any CP, SOB, palpitations, lightheadedness, syncope, orthopnea, PND.  She does have BLE edema +1 L > R on exam today.    Last Recorded Vitals:  Vitals:    04/08/25 1305   BP: 126/58   BP Location: Left arm   Patient Position: Sitting   BP Cuff Size: Adult   Pulse: 98   Weight: 80.5 kg (177 lb 6.4 oz)   Height: 1.6 m (5' 3\")     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), Encounter for screening for malignant neoplasm of colon (01/16/2019), Encounter for screening for malignant neoplasm of colon (03/13/2019), Hyperlipidemia, Hypertension, Other abnormal and inconclusive findings on diagnostic imaging of breast (06/16/2017), Overweight (01/27/2022), Personal history of diseases of the skin and subcutaneous tissue (06/13/2016), Personal history of other diseases of the circulatory system (10/21/2021), Personal history of other diseases of the musculoskeletal system and connective tissue, Personal history of other infectious and parasitic diseases, Personal history of other infectious and parasitic diseases (09/09/2022), and Personal history of other specified conditions.    Past Surgical History:  She has a past surgical history that includes Appendectomy (05/26/2016); Hysterectomy (05/26/2016); Cardiac catheterization (N/A, 2/10/2025); and Cardiac catheterization (N/A, 2/10/2025).      Social History:  She reports that she has quit smoking. Her smoking use included cigarettes. She has never used smokeless " tobacco. She reports that she does not currently use alcohol. She reports that she does not use drugs.    Family History:  Family History   Problem Relation Name Age of Onset    Coronary artery disease Father      Other (gastic carcinoma) Other      Diabetes Sibling       Allergies:  Cephalexin    Outpatient Medications:  Current Outpatient Medications   Medication Instructions    cholecalciferol (Vitamin D-3) 125 MCG (5000 UT) capsule Take by mouth.    clobetasoL 0.05 % lotion 2 times daily    ketoconazole (NIZOral) 2 % shampoo Topical, Every other day, Apply to scalp every other day for 5 minutes and rinse    krill-omega-3-dha-epa-lipids 088-45-14-50 mg capsule 2 tablets, Daily    Lactobacillus acidophilus (PROBIOTIC ORAL) 1 tablet, Daily    pantoprazole (PROTONIX) 20 mg, oral, Daily    spironolactone (ALDACTONE) 25 mg, oral, Daily     Physical Exam:  General: no acute distress  HEENT: EOMI, no scleral icterus.  Lungs: Clear to auscultation bilaterally without wheezing, rales, or rhonchi.  Cardiovascular: Regular rhythm and rate. Normal S1 and S2. No murmurs, rubs, or gallops are appreciated. JVP normal.  Abdomen: Soft, nontender, nondistended. Bowel sounds present.  Extremities: Warm and well perfused with equal 2+ pulses bilaterally.  +1 BLE edema L>R.  Neurologic: Alert and oriented x3.      Last Labs:  CBC -  Lab Results   Component Value Date    WBC 7.8 02/07/2025    HGB 12.1 02/07/2025    HCT 38.3 02/07/2025    MCV 92 02/07/2025     02/07/2025     CMP -  Lab Results   Component Value Date    CALCIUM 9.6 02/21/2025    PHOS 3.5 02/21/2025    PROT 7.4 01/16/2025    ALBUMIN 4.1 02/21/2025    AST 17 01/16/2025    ALT 13 01/16/2025    ALKPHOS 93 01/16/2025    BILITOT 0.3 01/16/2025     LIPID PANEL -   Lab Results   Component Value Date    CHOL 267 (H) 01/16/2025    TRIG 147 01/16/2025    HDL 67.9 01/16/2025    CHHDL 3.9 01/16/2025    LDLF 184 (H) 10/21/2021    VLDL 29 01/16/2025    NHDL 199 (H) 01/16/2025      RENAL FUNCTION PANEL -   Lab Results   Component Value Date    GLUCOSE 160 (H) 02/21/2025     02/21/2025    K 4.2 02/21/2025     02/21/2025    CO2 26 02/21/2025    ANIONGAP 15 02/07/2025    BUN 24 02/21/2025    CREATININE 1.05 (H) 02/21/2025    CALCIUM 9.6 02/21/2025    PHOS 3.5 02/21/2025    ALBUMIN 4.1 02/21/2025      Lab Results   Component Value Date    BNP 29 01/06/2022    HGBA1C 6.1 (H) 10/05/2023     Last Cardiology Tests:  ECG:  ECG 12 Lead 02/10/2025  NSR    Echo:  Transthoracic Echo (TTE) Complete 09/24/2024   1. Left ventricular ejection fraction is normal, by visual estimate at 60-65%.   2. Spectral Doppler shows an impaired relaxation pattern of left ventricular diastolic filling.   3. There is normal right ventricular global systolic function.   4. Right ventricular systolic pressure is within normal limits.   5. Aortic valve sclerosis.    Cath:  Cardiac Catheterization Procedure 02/10/2025  1. MILNER c/f anginal equivalent.   2. Moderate mid RCA disease and moderate distal LM disease (eccentric - FFR negative).   3. Elevated pHTN and PCWP with mini-exercise (LUE only x 2.5 mins).   4. Trial of spironolactone.   5. Left Ventricular end-diastolic pressure = 15.    Stress Test:  Nuclear Stress Test   1. Normal ST response to pharmacologic nulcear cardiac stress test with baseline first degree AV block.  2. Nuclear image results:  1. Normal stress myocardial perfusion imaging in response to  pharmacologic stress.  2. Well-maintained left ventricular function.  77%    Cardiac Imaging:  CT coronary with heart flow 1/21/2025  1. Very limited study due to motion artifact.  2.  Multiple calcified and noncalcified in proximal and mid LAD  causing 50-70% stenosis. Assessment is significantly limited due to  motion artifact. Consider correlation with coronary catheter  angiography.  3. Common origin of RCA and LCX from the right coronary artery cusp.  LCX then courses posterior and inferior to the  aortic root and  continues into the left atrioventricular groove.  4. Extensive atherosclerotic calcification in the proximal and mid  LCX causing 50-70% stenosis.Assessment is however significantly  limited due to motion artifact. Consider correlation with catheter  coronary angiography findings.  5. Dense calcification in the mid and distal RCA. Unable to assess  degree of stenosis due to motion artifact. Consider correlation with  catheter coronary angiography  6. Right dominant system  7. Moderate emphysema in the visualized portion of the lungs.    CT cardiac scoring wo IV contrast 01/21/2025  1. Coronary artery calcium score of 1745.5*.  2. CT angio coronary artery reported separately.  3. Stable left upper lobe 6 mm nodule, for which follow-up CT chest  is recommended in 1 year.  LM 0  .7  LCx 85.9  .0  Total 1745.5    Carotid Duplex 1/17/2025  Right Carotid: 50 to 69% stenosis of the right proximal internal carotid artery. Laminar flow seen by color Doppler. Right external carotid artery appears patent with no evidence of stenosis. The right vertebral artery is patent with antegrade flow. No evidence of hemodynamically significant stenosis in the right subclavian artery.  Left Carotid: <50% stenosis of the left proximal internal carotid artery. Laminar flow seen by color Doppler. Left external carotid artery appears patent with no evidence of stenosis. The left vertebral artery is patent with antegrade flow. No evidence of hemodynamically significant stenosis in the left subclavian artery.    I have personally reviewed most recent PCP, cardiology, vascular, studies and/or documentation.      Assessment/Plan   CAD, elevated CT calcium score 1,745.5 units (1/21/25). She did undergo a cardiac catheterization moderate mid RCA disease and moderate distal LM disease (eccentric - FFR negative) (2/10/2025).  She denies any complaints of chest pain at today's visit whatsoever.  I am asking her to start  taking baby aspirin daily. We discussed the importance of initiation of statin therapy, however patient declined.  Will continue to work on this.    HTN, well-controlled, /58 today. She is on spironolactone 25 mg daily.     HLD, cholesterol 267, , HDL 67.9, triglycerides 147. She is not on any cholesterol lowering medication. We discussed the need for statin initiation and she declined today. She reports she is very sensitive to medications and would like to try lifestyle modifications. Repeat fasting blood work ordered to be done prior to her next visit with Dr. Alicia. Goal LDL <70. If she is not at goal we will revisit statin therapy initiation.     Pulmonary HTN, elevated pHTN and PCWP with mini-exercise (LUE only x 2.5 mins) noted on heart cath (2/10/2025). She is being followed by pulmonology. She reports that her SOB is much improved after she was taken off amlodipine and started on spironolactone.     AV sclerosis, moderate aortic valve thickening with evidence of mildly elevated transaortic gradients, aortic valve dimensionless index is 0.7 and trace aortic valve regurgitation, peak instantaneous gradient of the aortic valve is 18.8 mmHg and mean gradient of the aortic valve is 9.4 mmHg noted on ECHO (9/24/2024).     Carotid stenosis, right carotid 50 to 69% stenosis noted on most recent carotid duplex (1/17/25). She is asymptomatic. We did discuss initiation statin therapy, however she declined.  Will continue to work on this.    Follow up with me in 1 month.     Lorrie Martinez, APRN-CNP

## 2025-05-06 LAB
ALBUMIN SERPL-MCNC: 4.1 G/DL (ref 3.6–5.1)
ALP SERPL-CCNC: 80 U/L (ref 37–153)
ALT SERPL-CCNC: 15 U/L (ref 6–29)
ANION GAP SERPL CALCULATED.4IONS-SCNC: 11 MMOL/L (CALC) (ref 7–17)
ANION GAP SERPL CALCULATED.4IONS-SCNC: 11 MMOL/L (CALC) (ref 7–17)
AST SERPL-CCNC: 16 U/L (ref 10–35)
BILIRUB SERPL-MCNC: 0.4 MG/DL (ref 0.2–1.2)
BUN SERPL-MCNC: 29 MG/DL (ref 7–25)
BUN SERPL-MCNC: 29 MG/DL (ref 7–25)
BUN/CREAT SERPL: 25 (CALC) (ref 6–22)
CALCIUM SERPL-MCNC: 9.9 MG/DL (ref 8.6–10.4)
CALCIUM SERPL-MCNC: 9.9 MG/DL (ref 8.6–10.4)
CHLORIDE SERPL-SCNC: 103 MMOL/L (ref 98–110)
CHLORIDE SERPL-SCNC: 103 MMOL/L (ref 98–110)
CHOLEST SERPL-MCNC: 259 MG/DL
CHOLEST/HDLC SERPL: 4 (CALC)
CO2 SERPL-SCNC: 24 MMOL/L (ref 20–32)
CO2 SERPL-SCNC: 24 MMOL/L (ref 20–32)
CREAT SERPL-MCNC: 1.14 MG/DL (ref 0.6–1)
CREAT SERPL-MCNC: 1.14 MG/DL (ref 0.6–1)
EGFRCR SERPLBLD CKD-EPI 2021: 49 ML/MIN/1.73M2
EGFRCR SERPLBLD CKD-EPI 2021: 49 ML/MIN/1.73M2
GLUCOSE SERPL-MCNC: 156 MG/DL (ref 65–139)
GLUCOSE SERPL-MCNC: 156 MG/DL (ref 65–139)
HDLC SERPL-MCNC: 65 MG/DL
LDLC SERPL CALC-MCNC: 170 MG/DL (CALC)
NONHDLC SERPL-MCNC: 194 MG/DL (CALC)
POTASSIUM SERPL-SCNC: 4.4 MMOL/L (ref 3.5–5.3)
POTASSIUM SERPL-SCNC: 4.4 MMOL/L (ref 3.5–5.3)
PROT SERPL-MCNC: 7.3 G/DL (ref 6.1–8.1)
SODIUM SERPL-SCNC: 138 MMOL/L (ref 135–146)
SODIUM SERPL-SCNC: 138 MMOL/L (ref 135–146)
TRIGL SERPL-MCNC: 113 MG/DL

## 2025-05-13 ENCOUNTER — OFFICE VISIT (OUTPATIENT)
Dept: CARDIOLOGY | Facility: CLINIC | Age: 80
End: 2025-05-13
Payer: MEDICARE

## 2025-05-13 VITALS
WEIGHT: 173.6 LBS | HEART RATE: 100 BPM | OXYGEN SATURATION: 93 % | SYSTOLIC BLOOD PRESSURE: 138 MMHG | DIASTOLIC BLOOD PRESSURE: 80 MMHG | BODY MASS INDEX: 30.75 KG/M2

## 2025-05-13 DIAGNOSIS — I65.29 CAROTID ATHEROSCLEROSIS, UNSPECIFIED LATERALITY: ICD-10-CM

## 2025-05-13 DIAGNOSIS — I25.10 CORONARY ARTERY DISEASE INVOLVING NATIVE CORONARY ARTERY OF NATIVE HEART WITHOUT ANGINA PECTORIS: ICD-10-CM

## 2025-05-13 DIAGNOSIS — R06.02 SOB (SHORTNESS OF BREATH): ICD-10-CM

## 2025-05-13 DIAGNOSIS — I27.20 PULMONARY HTN (MULTI): ICD-10-CM

## 2025-05-13 DIAGNOSIS — I10 BENIGN HYPERTENSION: ICD-10-CM

## 2025-05-13 DIAGNOSIS — I35.8 NON-RHEUMATIC AORTIC SCLEROSIS: ICD-10-CM

## 2025-05-13 DIAGNOSIS — E78.2 MIXED HYPERLIPIDEMIA: ICD-10-CM

## 2025-05-13 PROCEDURE — 99214 OFFICE O/P EST MOD 30 MIN: CPT

## 2025-05-13 PROCEDURE — 99212 OFFICE O/P EST SF 10 MIN: CPT

## 2025-05-13 PROCEDURE — 1159F MED LIST DOCD IN RCRD: CPT

## 2025-05-13 PROCEDURE — 1160F RVW MEDS BY RX/DR IN RCRD: CPT

## 2025-05-13 PROCEDURE — 1036F TOBACCO NON-USER: CPT

## 2025-05-13 PROCEDURE — 3079F DIAST BP 80-89 MM HG: CPT

## 2025-05-13 PROCEDURE — 3075F SYST BP GE 130 - 139MM HG: CPT

## 2025-05-13 RX ORDER — CYCLOBENZAPRINE HCL 10 MG
10 TABLET ORAL 3 TIMES DAILY PRN
COMMUNITY

## 2025-05-13 NOTE — PROGRESS NOTES
Chief Complaint:   Follow-up     History Of Present Illness:    I am seeing Ayleen today for follow-up. She reports that she is in a lot of pain today with her sciatica pain and is currently awaiting pre authorization for nerve block. She reports she is having significant pain in her lower back that is running down her left leg. She denies any CP, SOB, palpitations, lightheadedness, syncope, orthopnea, PND, lower extremity edema.  We discussed the importance of statin therapy initiation at today's visit, however patient continues to decline.  I did discuss with her the risks of possible cardiovascular disease progression as well as stroke with elevated cholesterol levels and patient reports she understands.    4/08/2025  Silvia Damico is a 79 y.o. female with PMHx of CAD, HTN, HLD, thrombocytopenia, angina, scleroderma, presenting today for follow-up s/p [facility] on 2/10/2025 with Dr. Alicia.  She initially had some GI upset with GERD after starting spironolactone, however reports her symptoms have now resolved. She denies any CP, SOB, palpitations, lightheadedness, syncope, orthopnea, PND.  She does have BLE edema +1 L > R on exam today.    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), Encounter for screening for malignant neoplasm of colon (01/16/2019), Encounter for screening for malignant neoplasm of colon (03/13/2019), Hyperlipidemia, Hypertension, Other abnormal and inconclusive findings on diagnostic imaging of breast (06/16/2017), Overweight (01/27/2022), Personal history of diseases of the skin and subcutaneous tissue (06/13/2016), Personal history of other diseases of the circulatory system  (10/21/2021), Personal history of other diseases of the musculoskeletal system and connective tissue, Personal history of other infectious and parasitic diseases, Personal history of other infectious and parasitic diseases (09/09/2022), and Personal history of other specified conditions.    Past Surgical History:  She has a past surgical history that includes Appendectomy (05/26/2016); Hysterectomy (05/26/2016); Cardiac catheterization (N/A, 2/10/2025); and Cardiac catheterization (N/A, 2/10/2025).      Social History:  She reports that she has quit smoking. Her smoking use included cigarettes. She has never used smokeless tobacco. She reports that she does not currently use alcohol. She reports that she does not use drugs.    Family History:  Family History   Problem Relation Name Age of Onset    Coronary artery disease Father      Other (gastic carcinoma) Other      Diabetes Sibling       Allergies:  Cephalexin    Outpatient Medications:  Current Outpatient Medications   Medication Instructions    aspirin 81 mg, oral, Daily    cholecalciferol (Vitamin D-3) 125 MCG (5000 UT) capsule Take by mouth.    clobetasoL 0.05 % lotion 2 times daily    ketoconazole (NIZOral) 2 % shampoo Topical, Every other day, Apply to scalp every other day for 5 minutes and rinse    krill-omega-3-dha-epa-lipids 642-50-75-50 mg capsule 2 tablets, Daily    Lactobacillus acidophilus (PROBIOTIC ORAL) 1 tablet, Daily    pantoprazole (PROTONIX) 20 mg, oral, Daily    spironolactone (ALDACTONE) 25 mg, oral, Daily     Physical Exam:  General: no acute distress  HEENT: EOMI, no scleral icterus.  Lungs: Clear to auscultation bilaterally without wheezing, rales, or rhonchi.  Cardiovascular: Regular rhythm and rate. Normal S1 and S2. No murmurs, rubs, or gallops are appreciated. JVP normal.  Abdomen: Soft, nontender, nondistended. Bowel sounds present.  Extremities: Warm and well perfused with equal 2+ pulses bilaterally.  No edema.  Neurologic: Alert  and oriented x3.      Last Labs:  CBC -  Lab Results   Component Value Date    WBC 7.8 02/07/2025    HGB 12.1 02/07/2025    HCT 38.3 02/07/2025    MCV 92 02/07/2025     02/07/2025     CMP -  Lab Results   Component Value Date    CALCIUM 9.9 05/06/2025    CALCIUM 9.9 05/06/2025    PHOS 3.5 02/21/2025    PROT 7.3 05/06/2025    ALBUMIN 4.1 05/06/2025    AST 16 05/06/2025    ALT 15 05/06/2025    ALKPHOS 80 05/06/2025    BILITOT 0.4 05/06/2025     LIPID PANEL -   Lab Results   Component Value Date    CHOL 259 (H) 05/06/2025    TRIG 113 05/06/2025    HDL 65 05/06/2025    CHHDL 4.0 05/06/2025    LDLF 184 (H) 10/21/2021    VLDL 29 01/16/2025    NHDL 194 (H) 05/06/2025     RENAL FUNCTION PANEL -   Lab Results   Component Value Date    GLUCOSE 156 (H) 05/06/2025    GLUCOSE 156 (H) 05/06/2025     05/06/2025     05/06/2025    K 4.4 05/06/2025    K 4.4 05/06/2025     05/06/2025     05/06/2025    CO2 24 05/06/2025    CO2 24 05/06/2025    ANIONGAP 11 05/06/2025    ANIONGAP 11 05/06/2025    BUN 29 (H) 05/06/2025    BUN 29 (H) 05/06/2025    CREATININE 1.14 (H) 05/06/2025    CREATININE 1.14 (H) 05/06/2025    CALCIUM 9.9 05/06/2025    CALCIUM 9.9 05/06/2025    PHOS 3.5 02/21/2025    ALBUMIN 4.1 05/06/2025      Lab Results   Component Value Date    BNP 29 01/06/2022    HGBA1C 6.1 (H) 10/05/2023     Last Cardiology Tests:  ECG:  ECG 12 Lead 02/10/2025  NSR    Echo:  Transthoracic Echo (TTE) Complete 09/24/2024   1. Left ventricular ejection fraction is normal, by visual estimate at 60-65%.   2. Spectral Doppler shows an impaired relaxation pattern of left ventricular diastolic filling.   3. There is normal right ventricular global systolic function.   4. Right ventricular systolic pressure is within normal limits.   5. Aortic valve sclerosis.    Cath:  Cardiac Catheterization Procedure 02/10/2025  1. MILNER c/f anginal equivalent.   2. Moderate mid RCA disease and moderate distal LM disease (eccentric - FFR  negative).   3. Elevated pHTN and PCWP with mini-exercise (LUE only x 2.5 mins).   4. Trial of spironolactone.   5. Left Ventricular end-diastolic pressure = 15.    Stress Test:  Nuclear Stress Test   1. Normal ST response to pharmacologic nulcear cardiac stress test with baseline first degree AV block.  2. Nuclear image results:  1. Normal stress myocardial perfusion imaging in response to  pharmacologic stress.  2. Well-maintained left ventricular function.  77%    Cardiac Imaging:  CT coronary with heart flow 1/21/2025  1. Very limited study due to motion artifact.  2.  Multiple calcified and noncalcified in proximal and mid LAD  causing 50-70% stenosis. Assessment is significantly limited due to  motion artifact. Consider correlation with coronary catheter  angiography.  3. Common origin of RCA and LCX from the right coronary artery cusp.  LCX then courses posterior and inferior to the aortic root and  continues into the left atrioventricular groove.  4. Extensive atherosclerotic calcification in the proximal and mid  LCX causing 50-70% stenosis.Assessment is however significantly  limited due to motion artifact. Consider correlation with catheter  coronary angiography findings.  5. Dense calcification in the mid and distal RCA. Unable to assess  degree of stenosis due to motion artifact. Consider correlation with  catheter coronary angiography  6. Right dominant system  7. Moderate emphysema in the visualized portion of the lungs.    CT cardiac scoring wo IV contrast 01/21/2025  1. Coronary artery calcium score of 1745.5*.  2. CT angio coronary artery reported separately.  3. Stable left upper lobe 6 mm nodule, for which follow-up CT chest  is recommended in 1 year.  LM 0  .7  LCx 85.9  .0  Total 1745.5    Carotid Duplex 1/17/2025  Right Carotid: 50 to 69% stenosis of the right proximal internal carotid artery. Laminar flow seen by color Doppler. Right external carotid artery appears patent with  no evidence of stenosis. The right vertebral artery is patent with antegrade flow. No evidence of hemodynamically significant stenosis in the right subclavian artery.  Left Carotid: <50% stenosis of the left proximal internal carotid artery. Laminar flow seen by color Doppler. Left external carotid artery appears patent with no evidence of stenosis. The left vertebral artery is patent with antegrade flow. No evidence of hemodynamically significant stenosis in the left subclavian artery.    I have personally reviewed most recent PCP, cardiology, vascular, studies and/or documentation.      Assessment/Plan   CAD, elevated CT calcium score 1,745.5 units (1/21/25). She did undergo a cardiac catheterization moderate mid RCA disease and moderate distal LM disease (eccentric - FFR negative) (2/10/2025).  She denies any complaints of chest pain at today's visit whatsoever.  I am asking her to start taking baby aspirin daily.  I had a long discussion with patient about the importance of statin therapy, however patient declined.     HTN, stable, /80 today. She is on spironolactone 25 mg daily.  At her previous visit her BP was 126/58.  I believe that her blood pressure is elevated today due to the pain. She did bring her home blood pressure cuff for me to evaluate at today's visit and it is accurate.  Will have the patient continue monitoring her blood pressure at home and keep a log.  Goal BP <130/80.  If her blood pressure remains above goal we will need to adjust her antihypertensive medications.    HLD, 1/16/2025 cholesterol 267, , HDL 67.9, triglycerides 147. She is not on any cholesterol lowering medication. Repeat fasting lipid panel 5/6/2025 , HDL 65, triglycerides 113. Goal LDL <70. We discussed the need for statin initiation and she declined today. She reports she is very sensitive to medications and would like to try lifestyle modifications.     Pulmonary HTN, elevated pHTN and PCWP with  mini-exercise (LUE only x 2.5 mins) noted on heart cath (2/10/2025). She is being followed by pulmonology. She reports that her SOB is much improved after she was taken off amlodipine and started on spironolactone.     AV sclerosis, moderate aortic valve thickening with evidence of mildly elevated transaortic gradients, aortic valve dimensionless index is 0.7 and trace aortic valve regurgitation, peak instantaneous gradient of the aortic valve is 18.8 mmHg and mean gradient of the aortic valve is 9.4 mmHg noted on ECHO (9/24/2024).     Carotid stenosis, right carotid 50 to 69% stenosis noted on most recent carotid duplex (1/17/25). She is asymptomatic. We did discuss initiation statin therapy, however she declined.  Will continue to work on this.    Follow up with Dr. Alicia as scheduled.     Lorrie Martinez, ELIZABETH-CNP

## 2025-06-11 DIAGNOSIS — E78.5 HYPERLIPIDEMIA, UNSPECIFIED HYPERLIPIDEMIA TYPE: ICD-10-CM

## 2025-06-17 DIAGNOSIS — M47.817 LUMBOSACRAL SPONDYLOSIS WITHOUT MYELOPATHY: Primary | ICD-10-CM

## 2025-07-01 ENCOUNTER — TELEPHONE (OUTPATIENT)
Dept: PAIN MEDICINE | Facility: CLINIC | Age: 80
End: 2025-07-01
Payer: MEDICARE

## 2025-07-02 ENCOUNTER — TELEPHONE (OUTPATIENT)
Dept: PAIN MEDICINE | Facility: CLINIC | Age: 80
End: 2025-07-02
Payer: MEDICARE

## 2025-07-10 ENCOUNTER — HOSPITAL ENCOUNTER (OUTPATIENT)
Dept: PAIN MEDICINE | Facility: CLINIC | Age: 80
Discharge: HOME | End: 2025-07-10
Payer: MEDICARE

## 2025-07-10 VITALS
HEIGHT: 63 IN | HEART RATE: 107 BPM | SYSTOLIC BLOOD PRESSURE: 151 MMHG | TEMPERATURE: 96.8 F | OXYGEN SATURATION: 96 % | RESPIRATION RATE: 18 BRPM | DIASTOLIC BLOOD PRESSURE: 81 MMHG | WEIGHT: 170 LBS | BODY MASS INDEX: 30.12 KG/M2

## 2025-07-10 DIAGNOSIS — M47.817 LUMBOSACRAL SPONDYLOSIS WITHOUT MYELOPATHY: ICD-10-CM

## 2025-07-10 PROCEDURE — 7100000009 HC PHASE TWO TIME - INITIAL BASE CHARGE

## 2025-07-10 PROCEDURE — 2500000004 HC RX 250 GENERAL PHARMACY W/ HCPCS (ALT 636 FOR OP/ED): Performed by: INTERNAL MEDICINE

## 2025-07-10 PROCEDURE — 64494 INJ PARAVERT F JNT L/S 2 LEV: CPT | Mod: 50 | Performed by: INTERNAL MEDICINE

## 2025-07-10 PROCEDURE — 64493 INJ PARAVERT F JNT L/S 1 LEV: CPT | Mod: 50 | Performed by: INTERNAL MEDICINE

## 2025-07-10 RX ORDER — BUPIVACAINE HYDROCHLORIDE 5 MG/ML
INJECTION, SOLUTION EPIDURAL; INTRACAUDAL; PERINEURAL AS NEEDED
Status: COMPLETED | OUTPATIENT
Start: 2025-07-10 | End: 2025-07-10

## 2025-07-10 RX ORDER — METHYLPREDNISOLONE ACETATE 40 MG/ML
INJECTION, SUSPENSION INTRA-ARTICULAR; INTRALESIONAL; INTRAMUSCULAR; SOFT TISSUE AS NEEDED
Status: COMPLETED | OUTPATIENT
Start: 2025-07-10 | End: 2025-07-10

## 2025-07-10 RX ORDER — LIDOCAINE HYDROCHLORIDE 10 MG/ML
INJECTION, SOLUTION EPIDURAL; INFILTRATION; INTRACAUDAL; PERINEURAL AS NEEDED
Status: COMPLETED | OUTPATIENT
Start: 2025-07-10 | End: 2025-07-10

## 2025-07-10 RX ADMIN — LIDOCAINE HYDROCHLORIDE 10 ML: 10 INJECTION, SOLUTION EPIDURAL; INFILTRATION; INTRACAUDAL; PERINEURAL at 11:18

## 2025-07-10 RX ADMIN — BUPIVACAINE HYDROCHLORIDE 10 ML: 5 INJECTION, SOLUTION EPIDURAL; INTRACAUDAL; PERINEURAL at 11:18

## 2025-07-10 RX ADMIN — METHYLPREDNISOLONE ACETATE 80 MG: 40 INJECTION, SUSPENSION INTRA-ARTICULAR; INTRALESIONAL; INTRAMUSCULAR; INTRASYNOVIAL; SOFT TISSUE at 11:18

## 2025-07-10 ASSESSMENT — PAIN SCALES - GENERAL
PAINLEVEL_OUTOF10: 0 - NO PAIN
PAINLEVEL_OUTOF10: 8

## 2025-07-10 ASSESSMENT — PAIN - FUNCTIONAL ASSESSMENT
PAIN_FUNCTIONAL_ASSESSMENT: 0-10
PAIN_FUNCTIONAL_ASSESSMENT: 0-10

## 2025-07-10 ASSESSMENT — PAIN DESCRIPTION - DESCRIPTORS: DESCRIPTORS: SPASM

## 2025-07-10 NOTE — H&P
History Of Present Illness  Silvia Damico is a 79 y.o. female presenting with 8 out of 10 low back pain.     Past Medical History  Medical History[1]    Surgical History  Surgical History[2]     Social History  She reports that she has quit smoking. Her smoking use included cigarettes. She has never used smokeless tobacco. She reports that she does not currently use alcohol. She reports that she does not use drugs.    Family History  Family History[3]     Allergies  Cephalexin    Review of Systems  Denies bowel bladder retention/incontinence  Physical Exam  L4-5 L5-S1 facet tenderness  Last Recorded Vitals  There were no vitals taken for this visit.    Relevant Results        MRI demonstrates lumbar facet arthropathy     Assessment & Plan  Lumbosacral spondylosis without myelopathy      Proceed with diagnostic L4-5 L5-S1 medial branch block    I spent 10 minutes in the professional and overall care of this patient.      Isaias Irene DO         [1]   Past Medical History:  Diagnosis Date    Chest pain, unspecified 01/06/2022    Acute chest pain    Cutaneous abscess of groin 05/26/2016    Abscess of groin, left    Dyskinesia of esophagus 03/13/2019    Esophageal spasm    Encounter for screening for malignant neoplasm of colon 01/16/2019    Encounter for screening colonoscopy    Encounter for screening for malignant neoplasm of colon 03/13/2019    Colon cancer screening    Hyperlipidemia     Hypertension     Other abnormal and inconclusive findings on diagnostic imaging of breast 06/16/2017    Abnormal findings on diagnostic imaging of breast    Overweight 01/27/2022    Overweight with body mass index (BMI) of 27 to 27.9 in adult    Personal history of diseases of the skin and subcutaneous tissue 06/13/2016    History of hidradenitis suppurativa    Personal history of other diseases of the circulatory system 10/21/2021    History of abnormal electrocardiography    Personal history of other diseases of the  musculoskeletal system and connective tissue     History of backache    Personal history of other infectious and parasitic diseases     History of infectious disease    Personal history of other infectious and parasitic diseases 09/09/2022    History of herpes zoster    Personal history of other specified conditions     History of nausea and vomiting   [2]   Past Surgical History:  Procedure Laterality Date    APPENDECTOMY  05/26/2016    Appendectomy    CARDIAC CATHETERIZATION N/A 2/10/2025    Procedure: Left And Right Heart Cath, With LV;  Surgeon: Milind Alicia MD;  Location: [facility] Cardiac Cath Lab;  Service: Cardiovascular;  Laterality: N/A;  2nd case    CARDIAC CATHETERIZATION N/A 2/10/2025    Procedure: FFR (Fractional Flow Reserve) - During Cardiac Catheterization Procedure;  Surgeon: Milind Alicia MD;  Location: [facility] Cardiac Cath Lab;  Service: Cardiovascular;  Laterality: N/A;    HYSTERECTOMY  05/26/2016    Hysterectomy   [3]   Family History  Problem Relation Name Age of Onset    Coronary artery disease Father      Other (gastic carcinoma) Other      Diabetes Sibling

## 2025-08-08 PROBLEM — M17.0 BILATERAL PRIMARY OSTEOARTHRITIS OF KNEE: Status: ACTIVE | Noted: 2025-06-14

## 2025-08-08 PROBLEM — M75.52 SUBACROMIAL BURSITIS OF LEFT SHOULDER JOINT: Status: ACTIVE | Noted: 2025-05-13

## 2025-08-21 ENCOUNTER — APPOINTMENT (OUTPATIENT)
Dept: CARDIOLOGY | Facility: CLINIC | Age: 80
End: 2025-08-21
Payer: MEDICARE

## 2025-08-23 LAB
ALBUMIN SERPL-MCNC: 3.9 G/DL (ref 3.6–5.1)
ALP SERPL-CCNC: 84 U/L (ref 37–153)
ALT SERPL-CCNC: 13 U/L (ref 6–29)
ANION GAP SERPL CALCULATED.4IONS-SCNC: 9 MMOL/L (CALC) (ref 7–17)
AST SERPL-CCNC: 13 U/L (ref 10–35)
BILIRUB SERPL-MCNC: 0.3 MG/DL (ref 0.2–1.2)
BUN SERPL-MCNC: 22 MG/DL (ref 7–25)
CALCIUM SERPL-MCNC: 9.5 MG/DL (ref 8.6–10.4)
CHLORIDE SERPL-SCNC: 104 MMOL/L (ref 98–110)
CHOLEST SERPL-MCNC: 235 MG/DL
CHOLEST/HDLC SERPL: 3.9 (CALC)
CO2 SERPL-SCNC: 26 MMOL/L (ref 20–32)
CREAT SERPL-MCNC: 0.96 MG/DL (ref 0.6–1)
EGFRCR SERPLBLD CKD-EPI 2021: 61 ML/MIN/1.73M2
GLUCOSE SERPL-MCNC: 108 MG/DL (ref 65–99)
HDLC SERPL-MCNC: 60 MG/DL
LDLC SERPL CALC-MCNC: 153 MG/DL (CALC)
NONHDLC SERPL-MCNC: 175 MG/DL (CALC)
POTASSIUM SERPL-SCNC: 4.6 MMOL/L (ref 3.5–5.3)
PROT SERPL-MCNC: 6.7 G/DL (ref 6.1–8.1)
SODIUM SERPL-SCNC: 139 MMOL/L (ref 135–146)
TRIGL SERPL-MCNC: 106 MG/DL

## 2025-08-25 ENCOUNTER — RESULTS FOLLOW-UP (OUTPATIENT)
Dept: CARDIOLOGY | Facility: CLINIC | Age: 80
End: 2025-08-25
Payer: MEDICARE

## 2025-08-28 ENCOUNTER — APPOINTMENT (OUTPATIENT)
Dept: CARDIOLOGY | Facility: CLINIC | Age: 80
End: 2025-08-28
Payer: MEDICARE

## 2025-08-28 VITALS
OXYGEN SATURATION: 95 % | WEIGHT: 176 LBS | DIASTOLIC BLOOD PRESSURE: 70 MMHG | HEIGHT: 63 IN | BODY MASS INDEX: 31.18 KG/M2 | SYSTOLIC BLOOD PRESSURE: 146 MMHG | HEART RATE: 90 BPM

## 2025-08-28 DIAGNOSIS — I25.10 CORONARY ARTERY DISEASE INVOLVING NATIVE CORONARY ARTERY OF NATIVE HEART WITHOUT ANGINA PECTORIS: ICD-10-CM

## 2025-08-28 DIAGNOSIS — I10 BENIGN HYPERTENSION: ICD-10-CM

## 2025-08-28 DIAGNOSIS — I50.30 (HFPEF) HEART FAILURE WITH PRESERVED EJECTION FRACTION: ICD-10-CM

## 2025-08-28 DIAGNOSIS — R06.02 SOB (SHORTNESS OF BREATH): ICD-10-CM

## 2025-08-28 DIAGNOSIS — E78.5 HYPERLIPIDEMIA: Primary | ICD-10-CM

## 2025-08-28 PROBLEM — E66.811 CLASS 1 OBESITY WITH BODY MASS INDEX (BMI) OF 30.0 TO 30.9 IN ADULT: Status: ACTIVE | Noted: 2025-08-28

## 2025-08-28 PROCEDURE — 1159F MED LIST DOCD IN RCRD: CPT | Performed by: INTERNAL MEDICINE

## 2025-08-28 PROCEDURE — 3078F DIAST BP <80 MM HG: CPT | Performed by: INTERNAL MEDICINE

## 2025-08-28 PROCEDURE — 99214 OFFICE O/P EST MOD 30 MIN: CPT | Performed by: INTERNAL MEDICINE

## 2025-08-28 PROCEDURE — 3077F SYST BP >= 140 MM HG: CPT | Performed by: INTERNAL MEDICINE

## 2025-08-28 RX ORDER — SPIRONOLACTONE 25 MG/1
25 TABLET ORAL DAILY
Qty: 90 TABLET | Refills: 3 | Status: SHIPPED | OUTPATIENT
Start: 2025-08-28 | End: 2025-08-28

## 2025-08-28 RX ORDER — SPIRONOLACTONE 25 MG/1
25 TABLET ORAL DAILY
Qty: 90 TABLET | Refills: 3 | Status: SHIPPED | OUTPATIENT
Start: 2025-08-28 | End: 2026-08-28

## 2026-01-20 ENCOUNTER — APPOINTMENT (OUTPATIENT)
Dept: PRIMARY CARE | Facility: CLINIC | Age: 81
End: 2026-01-20
Payer: MEDICARE

## (undated) DEVICE — GUIDEWIRE, PRESSURE WIRE X , 175CM WIRELESS, AGILE TIP

## (undated) DEVICE — CATHETER, OPTITORQUE, 5FR, TIG, 1H/100CM

## (undated) DEVICE — Device

## (undated) DEVICE — CATHETER, GUIDING, LAUNCHER, 6FR, JL 3.5

## (undated) DEVICE — INTRODUCER, GLIDESHEATH SLENDER A-KIT, 5FR 10CM

## (undated) DEVICE — SHEATH, GLIDESHEATH, SLENDER, 6FR 10CM

## (undated) DEVICE — CATHETER, GUIDING, LAUNCHER, 6 FR, MP 1

## (undated) DEVICE — TR BAND, RADIAL COMPRESSION, STANDARD, 24CM

## (undated) DEVICE — CATHETER, GUIDING, LAUNCHER, 6FR, JR 4.0

## (undated) DEVICE — GUIDEWIRE, INQWIRE, 3MM J, .035 X 210CM, FIXED

## (undated) DEVICE — VALVE, HEMO, GUARDIAN II, W/GUIDEWIRE INSERTION TOOL & TORQUE

## (undated) DEVICE — CATHETER, WEDGE PRESSURE, BALLOON, DOUBLE LUMEN, 5 FR, 110 CM